# Patient Record
Sex: FEMALE | Race: WHITE | NOT HISPANIC OR LATINO | Employment: FULL TIME | URBAN - NONMETROPOLITAN AREA
[De-identification: names, ages, dates, MRNs, and addresses within clinical notes are randomized per-mention and may not be internally consistent; named-entity substitution may affect disease eponyms.]

---
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
## 2020-02-22 ENCOUNTER — HOSPITAL ENCOUNTER (EMERGENCY)
Facility: HOSPITAL | Age: 20
Discharge: HOME | End: 2020-02-22
Attending: EMERGENCY MEDICINE | Admitting: EMERGENCY MEDICINE

## 2020-02-22 ENCOUNTER — APPOINTMENT (OUTPATIENT)
Dept: CT IMAGING | Facility: HOSPITAL | Age: 20
End: 2020-02-22

## 2020-02-22 VITALS
TEMPERATURE: 97.6 F | HEIGHT: 62 IN | OXYGEN SATURATION: 98 % | SYSTOLIC BLOOD PRESSURE: 113 MMHG | HEART RATE: 78 BPM | DIASTOLIC BLOOD PRESSURE: 65 MMHG | BODY MASS INDEX: 20.06 KG/M2 | RESPIRATION RATE: 19 BRPM | WEIGHT: 109 LBS

## 2020-02-22 DIAGNOSIS — R51.9 ACUTE NONINTRACTABLE HEADACHE, UNSPECIFIED HEADACHE TYPE: ICD-10-CM
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
## 2020-02-22 LAB — B-HCG UR QL: NEGATIVE

## 2020-02-22 PROCEDURE — 99284 EMERGENCY DEPT VISIT MOD MDM: CPT

## 2020-02-22 PROCEDURE — 81025 URINE PREGNANCY TEST: CPT | Performed by: PHYSICIAN ASSISTANT

## 2020-02-22 PROCEDURE — 70450 CT HEAD/BRAIN W/O DYE: CPT

## 2020-02-22 PROCEDURE — 72125 CT NECK SPINE W/O DYE: CPT

## 2020-02-22 PROCEDURE — 72125 CT NECK SPINE W/O DYE: CPT | Performed by: RADIOLOGY

## 2020-02-22 PROCEDURE — 70450 CT HEAD/BRAIN W/O DYE: CPT | Performed by: RADIOLOGY

## 2020-02-22 RX ORDER — SODIUM CHLORIDE 0.9 % (FLUSH) 0.9 %
10 SYRINGE (ML) INJECTION AS NEEDED
Status: DISCONTINUED | OUTPATIENT
Start: 2020-02-22 | End: 2020-02-22

## 2020-02-22 RX ORDER — ACETAMINOPHEN 500 MG
500 TABLET ORAL ONCE
Status: COMPLETED | OUTPATIENT
Start: 2020-02-22 | End: 2020-02-22

## 2020-02-22 RX ADMIN — ACETAMINOPHEN 500 MG: 500 TABLET ORAL at 12:59

## 2020-02-22 NOTE — ED NOTES
Patient was encouraged to produce a urine to rule out pregnancy prior to  Imaging.     Ej Hendrickson RN  02/22/20 3963
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
## 2020-04-10 ENCOUNTER — LAB (OUTPATIENT)
Dept: LAB | Facility: HOSPITAL | Age: 20
End: 2020-04-10

## 2020-04-10 DIAGNOSIS — Z20.828 EXPOSURE TO SARS-ASSOCIATED CORONAVIRUS: Primary | ICD-10-CM

## 2020-04-10 DIAGNOSIS — Z20.828 EXPOSURE TO SARS-ASSOCIATED CORONAVIRUS: ICD-10-CM

## 2020-04-10 PROCEDURE — 87635 SARS-COV-2 COVID-19 AMP PRB: CPT

## 2020-04-10 PROCEDURE — U0003 INFECTIOUS AGENT DETECTION BY NUCLEIC ACID (DNA OR RNA); SEVERE ACUTE RESPIRATORY SYNDROME CORONAVIRUS 2 (SARS-COV-2) (CORONAVIRUS DISEASE [COVID-19]), AMPLIFIED PROBE TECHNIQUE, MAKING USE OF HIGH THROUGHPUT TECHNOLOGIES AS DESCRIBED BY CMS-2020-01-R: HCPCS

## 2020-04-11 LAB — SARS-COV-2 RNA RESP QL NAA+PROBE: NOT DETECTED

## 2021-03-15 ENCOUNTER — HOSPITAL ENCOUNTER (EMERGENCY)
Facility: HOSPITAL | Age: 21
Discharge: HOME | End: 2021-03-15
Attending: FAMILY MEDICINE | Admitting: FAMILY MEDICINE

## 2021-03-15 VITALS
OXYGEN SATURATION: 99 % | SYSTOLIC BLOOD PRESSURE: 126 MMHG | TEMPERATURE: 99.1 F | BODY MASS INDEX: 22.08 KG/M2 | RESPIRATION RATE: 16 BRPM | WEIGHT: 120 LBS | HEART RATE: 84 BPM | HEIGHT: 62 IN | DIASTOLIC BLOOD PRESSURE: 65 MMHG

## 2021-03-15 DIAGNOSIS — H60.502 ACUTE OTITIS EXTERNA OF LEFT EAR, UNSPECIFIED TYPE: ICD-10-CM

## 2021-03-15 DIAGNOSIS — H66.90 ACUTE OTITIS MEDIA, UNSPECIFIED OTITIS MEDIA TYPE: Primary | ICD-10-CM

## 2021-03-15 PROCEDURE — 99282 EMERGENCY DEPT VISIT SF MDM: CPT

## 2021-03-15 RX ORDER — OFLOXACIN 3 MG/ML
5 SOLUTION AURICULAR (OTIC) DAILY
Qty: 5 ML | Refills: 0 | Status: SHIPPED | OUTPATIENT
Start: 2021-03-15 | End: 2021-03-22

## 2021-03-15 RX ORDER — AMOXICILLIN AND CLAVULANATE POTASSIUM 875; 125 MG/1; MG/1
1 TABLET, FILM COATED ORAL 2 TIMES DAILY
Qty: 20 TABLET | Refills: 0 | Status: ON HOLD | OUTPATIENT
Start: 2021-03-15 | End: 2021-12-13

## 2021-03-15 NOTE — ED PROVIDER NOTES
Subjective   20-year-old female presents the emergency room complaints of left earache.  Patient states she had an area for the past 3 days.  She denies fever chills nausea vomiting she has neck pain headache sinus pressure nasal congestion patient has sore throat she has difficulty swallowing.  She denies cough congestion.  She states that tonight she started having drainage from her left ear.  She does report that she has been using a Q-tip to her left ear as well.  She states that her hearing remains intact but does feel like occasionally her ear pops.  She has not been on antibiotics she is not taking medication for this issue.      Earache  Location:  Left  Quality:  Aching  Duration:  3 days  Timing:  Constant  Chronicity:  New  Relieved by:  Nothing  Associated symptoms: no congestion, no cough, no diarrhea, no fever, no headaches, no rhinorrhea and no sore throat        Review of Systems   Constitutional: Negative for fever.   HENT: Positive for ear pain. Negative for congestion, rhinorrhea and sore throat.    Respiratory: Negative for cough.    Gastrointestinal: Negative for diarrhea.   Neurological: Negative for headaches.   All other systems reviewed and are negative.      No past medical history on file.    No Known Allergies    Past Surgical History:   Procedure Laterality Date   • WISDOM TOOTH EXTRACTION  10/16/2017       Family History   Problem Relation Age of Onset   • Hypertension Father    • Thyroid disease Father    • Hypertension Mother    • Diabetes Mother    • Thyroid disease Mother    • Throat cancer Mother    • Stomach cancer Mother    • Ovarian cancer Mother    • Rheum arthritis Mother    • Seizures Mother    • Asthma Mother    • Rheum arthritis Paternal Grandfather    • Skin cancer Maternal Grandfather    • Atrial fibrillation Maternal Grandfather    • Rheum arthritis Maternal Grandfather    • Stroke Maternal Grandfather    • Asthma Brother        Social History     Socioeconomic History    • Marital status:      Spouse name: Not on file   • Number of children: Not on file   • Years of education: Not on file   • Highest education level: Not on file   Tobacco Use   • Smoking status: Never Smoker   • Smokeless tobacco: Never Used   Substance and Sexual Activity   • Alcohol use: No   • Drug use: No   • Sexual activity: Yes     Partners: Male     Birth control/protection: None           Objective   Physical Exam  Vitals and nursing note reviewed.   HENT:      Head: Normocephalic and atraumatic.      Right Ear: Tympanic membrane and ear canal normal.      Ears:      Comments: Left tympanic membrane red with fluid behind left ear.  Retracted left tympanic membrane.  Auditory canal diffuse red with with fluid-filled cyst anterior aspect.     Nose: Nose normal.      Mouth/Throat:      Mouth: Mucous membranes are moist.   Eyes:      Extraocular Movements: Extraocular movements intact.      Pupils: Pupils are equal, round, and reactive to light.   Neck:      Comments: No nuchal rigidity no cervical adenopathy.  Cardiovascular:      Rate and Rhythm: Normal rate and regular rhythm.   Pulmonary:      Effort: Pulmonary effort is normal.      Breath sounds: Normal breath sounds.      Comments: No rhonchi's rales or wheezes.  No accessory muscle use.  Abdominal:      General: Bowel sounds are normal.      Palpations: Abdomen is soft.   Musculoskeletal:      Cervical back: Neck supple.   Skin:     General: Skin is warm and dry.      Capillary Refill: Capillary refill takes less than 2 seconds.   Neurological:      Mental Status: She is alert and oriented to person, place, and time.      Cranial Nerves: No cranial nerve deficit.      Sensory: No sensory deficit.   Psychiatric:         Mood and Affect: Mood normal.         Procedures           ED Course  ED Course as of Mar 15 0436   Mon Mar 15, 2021   0435 Patient is noted to have findings of cyst in her auditory canal.  No foreign body was appreciated.   Patient has a tympanic membrane that is inflamed appearing patient has no mastoid tenderness.  Have discussed discontinuing Q-tips.  Have discussed follow-up with ENT and to contact office today.  Patient replaced on oral and topical antibiotics.  Have discussed warning symptoms went to the emergency room patient verbalized understanding.  Patient is afebrile nontoxic-appearing prior to discharge.    [BB]      ED Course User Index  [BB] Laurent Adan MD                                           [facility]    Final diagnoses:   Acute otitis media, unspecified otitis media type   Acute otitis externa of left ear, unspecified type            Laurent Adan MD  03/15/21 0437

## 2021-03-18 ENCOUNTER — BULK ORDERING (OUTPATIENT)
Dept: CASE MANAGEMENT | Facility: OTHER | Age: 21
End: 2021-03-18

## 2021-03-18 DIAGNOSIS — Z23 IMMUNIZATION DUE: ICD-10-CM

## 2021-12-04 ENCOUNTER — HOSPITAL ENCOUNTER (EMERGENCY)
Facility: HOSPITAL | Age: 21
Discharge: HOME | End: 2021-12-05
Attending: EMERGENCY MEDICINE | Admitting: EMERGENCY MEDICINE

## 2021-12-04 DIAGNOSIS — N83.202 CYST OF LEFT OVARY: ICD-10-CM

## 2021-12-04 DIAGNOSIS — O20.0 THREATENED ABORTION: Primary | ICD-10-CM

## 2021-12-04 LAB
ALBUMIN SERPL-MCNC: 4.33 G/DL (ref 3.5–5.2)
ALBUMIN/GLOB SERPL: 1.6 G/DL
ALP SERPL-CCNC: 76 U/L (ref 39–117)
ALT SERPL W P-5'-P-CCNC: 7 U/L (ref 1–33)
ANION GAP SERPL CALCULATED.3IONS-SCNC: 13.2 MMOL/L (ref 5–15)
AST SERPL-CCNC: 12 U/L (ref 1–32)
BASOPHILS # BLD AUTO: 0.06 10*3/MM3 (ref 0–0.2)
BASOPHILS NFR BLD AUTO: 0.5 % (ref 0–1.5)
BILIRUB SERPL-MCNC: 0.2 MG/DL (ref 0–1.2)
BUN SERPL-MCNC: 8 MG/DL (ref 6–20)
BUN/CREAT SERPL: 12.5 (ref 7–25)
CALCIUM SPEC-SCNC: 9.3 MG/DL (ref 8.6–10.5)
CHLORIDE SERPL-SCNC: 103 MMOL/L (ref 98–107)
CO2 SERPL-SCNC: 24.8 MMOL/L (ref 22–29)
CREAT SERPL-MCNC: 0.64 MG/DL (ref 0.57–1)
DEPRECATED RDW RBC AUTO: 41.3 FL (ref 37–54)
EOSINOPHIL # BLD AUTO: 0.01 10*3/MM3 (ref 0–0.4)
EOSINOPHIL NFR BLD AUTO: 0.1 % (ref 0.3–6.2)
ERYTHROCYTE [DISTWIDTH] IN BLOOD BY AUTOMATED COUNT: 12.1 % (ref 12.3–15.4)
GFR SERPL CREATININE-BSD FRML MDRD: 117 ML/MIN/1.73
GLOBULIN UR ELPH-MCNC: 2.8 GM/DL
GLUCOSE SERPL-MCNC: 96 MG/DL (ref 65–99)
HCT VFR BLD AUTO: 40.4 % (ref 34–46.6)
HGB BLD-MCNC: 13.5 G/DL (ref 12–15.9)
IMM GRANULOCYTES # BLD AUTO: 0.05 10*3/MM3 (ref 0–0.05)
IMM GRANULOCYTES NFR BLD AUTO: 0.4 % (ref 0–0.5)
LYMPHOCYTES # BLD AUTO: 1.72 10*3/MM3 (ref 0.7–3.1)
LYMPHOCYTES NFR BLD AUTO: 14.2 % (ref 19.6–45.3)
MCH RBC QN AUTO: 31.1 PG (ref 26.6–33)
MCHC RBC AUTO-ENTMCNC: 33.4 G/DL (ref 31.5–35.7)
MCV RBC AUTO: 93.1 FL (ref 79–97)
MONOCYTES # BLD AUTO: 0.7 10*3/MM3 (ref 0.1–0.9)
MONOCYTES NFR BLD AUTO: 5.8 % (ref 5–12)
NEUTROPHILS NFR BLD AUTO: 79 % (ref 42.7–76)
NEUTROPHILS NFR BLD AUTO: 9.57 10*3/MM3 (ref 1.7–7)
NRBC BLD AUTO-RTO: 0 /100 WBC (ref 0–0.2)
NUMBER OF DOSES: NORMAL
PLATELET # BLD AUTO: 225 10*3/MM3 (ref 140–450)
PMV BLD AUTO: 9.5 FL (ref 6–12)
POTASSIUM SERPL-SCNC: 4.1 MMOL/L (ref 3.5–5.2)
PROT SERPL-MCNC: 7.1 G/DL (ref 6–8.5)
RBC # BLD AUTO: 4.34 10*6/MM3 (ref 3.77–5.28)
SODIUM SERPL-SCNC: 141 MMOL/L (ref 136–145)
WBC NRBC COR # BLD: 12.11 10*3/MM3 (ref 3.4–10.8)

## 2021-12-04 PROCEDURE — 85025 COMPLETE CBC W/AUTO DIFF WBC: CPT | Performed by: PHYSICIAN ASSISTANT

## 2021-12-04 PROCEDURE — 86850 RBC ANTIBODY SCREEN: CPT | Performed by: PHYSICIAN ASSISTANT

## 2021-12-04 PROCEDURE — 86901 BLOOD TYPING SEROLOGIC RH(D): CPT | Performed by: PHYSICIAN ASSISTANT

## 2021-12-04 PROCEDURE — 80053 COMPREHEN METABOLIC PANEL: CPT | Performed by: PHYSICIAN ASSISTANT

## 2021-12-04 PROCEDURE — 86900 BLOOD TYPING SEROLOGIC ABO: CPT | Performed by: PHYSICIAN ASSISTANT

## 2021-12-04 PROCEDURE — 84702 CHORIONIC GONADOTROPIN TEST: CPT | Performed by: PHYSICIAN ASSISTANT

## 2021-12-04 PROCEDURE — 99283 EMERGENCY DEPT VISIT LOW MDM: CPT

## 2021-12-04 PROCEDURE — 36415 COLL VENOUS BLD VENIPUNCTURE: CPT

## 2021-12-05 ENCOUNTER — APPOINTMENT (OUTPATIENT)
Dept: ULTRASOUND IMAGING | Facility: HOSPITAL | Age: 21
End: 2021-12-05

## 2021-12-05 VITALS
TEMPERATURE: 98.1 F | SYSTOLIC BLOOD PRESSURE: 102 MMHG | DIASTOLIC BLOOD PRESSURE: 68 MMHG | BODY MASS INDEX: 21.71 KG/M2 | OXYGEN SATURATION: 98 % | RESPIRATION RATE: 18 BRPM | HEART RATE: 98 BPM | HEIGHT: 62 IN | WEIGHT: 118 LBS

## 2021-12-05 LAB
ABO GROUP BLD: NORMAL
BACTERIA UR QL AUTO: ABNORMAL /HPF
BILIRUB UR QL STRIP: NEGATIVE
BLD GP AB SCN SERPL QL: NEGATIVE
CLARITY UR: ABNORMAL
COLOR UR: ABNORMAL
GLUCOSE UR STRIP-MCNC: NEGATIVE MG/DL
HCG INTACT+B SERPL-ACNC: 99.66 MIU/ML
HGB UR QL STRIP.AUTO: ABNORMAL
HYALINE CASTS UR QL AUTO: ABNORMAL /LPF
KETONES UR QL STRIP: NEGATIVE
LEUKOCYTE ESTERASE UR QL STRIP.AUTO: NEGATIVE
NITRITE UR QL STRIP: NEGATIVE
PH UR STRIP.AUTO: 6.5 [PH] (ref 5–8)
PROT UR QL STRIP: ABNORMAL
RBC # UR STRIP: ABNORMAL /HPF
REF LAB TEST METHOD: ABNORMAL
RH BLD: POSITIVE
SP GR UR STRIP: 1.02 (ref 1–1.03)
SQUAMOUS #/AREA URNS HPF: ABNORMAL /HPF
UROBILINOGEN UR QL STRIP: ABNORMAL
WBC # UR STRIP: ABNORMAL /HPF

## 2021-12-05 PROCEDURE — 76817 TRANSVAGINAL US OBSTETRIC: CPT

## 2021-12-05 PROCEDURE — 81001 URINALYSIS AUTO W/SCOPE: CPT | Performed by: PHYSICIAN ASSISTANT

## 2021-12-05 RX ORDER — ACETAMINOPHEN 500 MG
1000 TABLET ORAL ONCE
Status: COMPLETED | OUTPATIENT
Start: 2021-12-05 | End: 2021-12-05

## 2021-12-05 RX ADMIN — ACETAMINOPHEN 1000 MG: 500 TABLET ORAL at 00:43

## 2021-12-05 NOTE — ED NOTES
Provided patient with urine cup. Informed patient the need for urine sample. Patient verbalized understanding. Patient unable to provide sample at this christopher. Will continue to encourage.     Suzette Rausch  12/04/21 2001

## 2021-12-05 NOTE — ED NOTES
Patient still unable to provide urine specimen. Will continue to encourage.      Kaden Dasilva, RN  12/04/21 8109

## 2021-12-05 NOTE — ED PROVIDER NOTES
Subjective   Patient is a 21-year-old female that presents to the ED with complaints of vaginal bleeding and abdominal cramping.  She is approximately 4 weeks and 6 days pregnant.  She states that she has been having some vaginal spotting for the last several days.  She states she is also having some left lower quadrant abdominal pain.  She does have a known ovarian cyst that is recently been found by her OB/GYN.  She also states that she has a history of miscarriages.  She denies chest pain, shortness of breath, fever, chills, nausea, vomiting, headache, dizziness.,  Dysuria, diarrhea, or constipation.      History provided by:  Patient   used: No        Review of Systems   Constitutional: Negative.  Negative for chills, diaphoresis, fatigue and fever.   HENT: Negative for congestion, drooling, ear discharge, ear pain, facial swelling, nosebleeds, postnasal drip, rhinorrhea, sinus pressure, sinus pain, sneezing, sore throat, tinnitus and trouble swallowing.    Eyes: Negative.  Negative for photophobia, pain, discharge, redness and itching.   Respiratory: Negative.  Negative for cough, shortness of breath and wheezing.    Cardiovascular: Negative.  Negative for chest pain.   Gastrointestinal: Negative.  Negative for abdominal distention, abdominal pain, diarrhea, nausea and vomiting.   Endocrine: Negative.    Genitourinary: Positive for vaginal bleeding. Negative for difficulty urinating, dysuria, flank pain, frequency and vaginal discharge.   Musculoskeletal: Negative.  Negative for arthralgias, back pain, gait problem, joint swelling, myalgias, neck pain and neck stiffness.   Skin: Negative.    Neurological: Negative.  Negative for dizziness, tremors, seizures, syncope, facial asymmetry, speech difficulty, weakness, light-headedness, numbness and headaches.   Psychiatric/Behavioral: Negative.  Negative for confusion.   All other systems reviewed and are negative.      History reviewed. No  pertinent past medical history.    No Known Allergies    Past Surgical History:   Procedure Laterality Date   • WISDOM TOOTH EXTRACTION  10/16/2017       Family History   Problem Relation Age of Onset   • Hypertension Father    • Thyroid disease Father    • Hypertension Mother    • Diabetes Mother    • Thyroid disease Mother    • Throat cancer Mother    • Stomach cancer Mother    • Ovarian cancer Mother    • Rheum arthritis Mother    • Seizures Mother    • Asthma Mother    • Rheum arthritis Paternal Grandfather    • Skin cancer Maternal Grandfather    • Atrial fibrillation Maternal Grandfather    • Rheum arthritis Maternal Grandfather    • Stroke Maternal Grandfather    • Asthma Brother        Social History     Socioeconomic History   • Marital status:    Tobacco Use   • Smoking status: Never Smoker   • Smokeless tobacco: Never Used   Substance and Sexual Activity   • Alcohol use: No   • Drug use: No   • Sexual activity: Yes     Partners: Male     Birth control/protection: None           Objective   Physical Exam  Vitals and nursing note reviewed.   Constitutional:       General: She is not in acute distress.     Appearance: She is well-developed. She is not diaphoretic.   HENT:      Head: Normocephalic and atraumatic.      Right Ear: External ear normal.      Left Ear: External ear normal.      Nose: Nose normal.      Mouth/Throat:      Mouth: Mucous membranes are moist.      Pharynx: Oropharynx is clear.   Eyes:      Extraocular Movements: Extraocular movements intact.      Conjunctiva/sclera: Conjunctivae normal.      Pupils: Pupils are equal, round, and reactive to light.   Neck:      Vascular: No JVD.      Trachea: No tracheal deviation.   Cardiovascular:      Rate and Rhythm: Normal rate and regular rhythm.      Pulses: Normal pulses.      Heart sounds: Normal heart sounds. No murmur heard.      Pulmonary:      Effort: Pulmonary effort is normal. No respiratory distress.      Breath sounds: Normal  breath sounds. No wheezing.   Abdominal:      General: Bowel sounds are normal. There is no distension.      Palpations: Abdomen is soft.      Tenderness: There is no abdominal tenderness. There is no guarding or rebound.      Hernia: No hernia is present.   Musculoskeletal:         General: No deformity. Normal range of motion.      Cervical back: Normal range of motion and neck supple.   Skin:     General: Skin is warm and dry.      Coloration: Skin is not pale.      Findings: No erythema or rash.   Neurological:      General: No focal deficit present.      Mental Status: She is alert and oriented to person, place, and time.      Cranial Nerves: No cranial nerve deficit.   Psychiatric:         Mood and Affect: Mood normal.         Behavior: Behavior normal.         Thought Content: Thought content normal.         Procedures           ED Course  ED Course as of 21 0142   Sun Dec 05, 2021   0115 US Ob Transvaginal  IMPRESSION:  Uterus and endometrial tissue appear normal. No gestational sac is visualized. There is no evidence of extrauterine pregnancy. This may be due to the patient's early pregnancy state and clinical correlation with possible follow-up ultrasound  is recommended     6 cm simple appearing left ovarian cyst     Otherwise normal     Signer Name: Leonard Gutierrez MD   Signed: 2021 1:11 AM []   0141 Discussed plan of care with patient.  Advised close follow-up with OB/GYN in 1 to 2 days.  Advised if she should develop fever or any worsening symptoms to return to the ED immediately. []      ED Course User Index  [] Milagros Gonzalez PA-C MDM    Final diagnoses:   Threatened    Cyst of left ovary       ED Disposition  ED Disposition     ED Disposition Condition Comment    Discharge Stable           Tj Alejo, DO  [address]  [address] [place] [address] [id number] [phone]    Schedule an appointment as soon as  possible for a visit in 1 day           Medication List      No changes were made to your prescriptions during this visit.          Milagros Gonzalez PA-C  12/05/21 0142

## 2021-12-10 ENCOUNTER — APPOINTMENT (OUTPATIENT)
Dept: ULTRASOUND IMAGING | Facility: HOSPITAL | Age: 21
End: 2021-12-10

## 2021-12-10 ENCOUNTER — HOSPITAL ENCOUNTER (EMERGENCY)
Facility: HOSPITAL | Age: 21
Discharge: HOME | End: 2021-12-10
Attending: EMERGENCY MEDICINE | Admitting: EMERGENCY MEDICINE

## 2021-12-10 VITALS
OXYGEN SATURATION: 97 % | RESPIRATION RATE: 18 BRPM | WEIGHT: 122 LBS | TEMPERATURE: 99.2 F | SYSTOLIC BLOOD PRESSURE: 93 MMHG | DIASTOLIC BLOOD PRESSURE: 58 MMHG | BODY MASS INDEX: 22.45 KG/M2 | HEART RATE: 71 BPM | HEIGHT: 62 IN

## 2021-12-10 DIAGNOSIS — O00.90 ECTOPIC PREGNANCY WITHOUT INTRAUTERINE PREGNANCY, UNSPECIFIED LOCATION: Primary | ICD-10-CM

## 2021-12-10 LAB
ABO GROUP BLD: NORMAL
ALBUMIN SERPL-MCNC: 4.15 G/DL (ref 3.5–5.2)
ALBUMIN/GLOB SERPL: 1.5 G/DL
ALP SERPL-CCNC: 74 U/L (ref 39–117)
ALT SERPL W P-5'-P-CCNC: 7 U/L (ref 1–33)
ANION GAP SERPL CALCULATED.3IONS-SCNC: 11.1 MMOL/L (ref 5–15)
AST SERPL-CCNC: 13 U/L (ref 1–32)
BACTERIA UR QL AUTO: NORMAL /HPF
BASOPHILS # BLD AUTO: 0.06 10*3/MM3 (ref 0–0.2)
BASOPHILS NFR BLD AUTO: 0.7 % (ref 0–1.5)
BILIRUB SERPL-MCNC: 0.5 MG/DL (ref 0–1.2)
BILIRUB UR QL STRIP: NEGATIVE
BLD GP AB SCN SERPL QL: NEGATIVE
BUN SERPL-MCNC: 6 MG/DL (ref 6–20)
BUN/CREAT SERPL: 10.7 (ref 7–25)
CALCIUM SPEC-SCNC: 9 MG/DL (ref 8.6–10.5)
CHLORIDE SERPL-SCNC: 104 MMOL/L (ref 98–107)
CLARITY UR: CLEAR
CO2 SERPL-SCNC: 22.9 MMOL/L (ref 22–29)
COLOR UR: YELLOW
CREAT SERPL-MCNC: 0.56 MG/DL (ref 0.57–1)
DEPRECATED RDW RBC AUTO: 43.3 FL (ref 37–54)
EOSINOPHIL # BLD AUTO: 0.02 10*3/MM3 (ref 0–0.4)
EOSINOPHIL NFR BLD AUTO: 0.2 % (ref 0.3–6.2)
ERYTHROCYTE [DISTWIDTH] IN BLOOD BY AUTOMATED COUNT: 12.6 % (ref 12.3–15.4)
GFR SERPL CREATININE-BSD FRML MDRD: 137 ML/MIN/1.73
GLOBULIN UR ELPH-MCNC: 2.8 GM/DL
GLUCOSE SERPL-MCNC: 92 MG/DL (ref 65–99)
GLUCOSE UR STRIP-MCNC: NEGATIVE MG/DL
HCG INTACT+B SERPL-ACNC: 116.9 MIU/ML
HCT VFR BLD AUTO: 40.2 % (ref 34–46.6)
HGB BLD-MCNC: 13 G/DL (ref 12–15.9)
HGB UR QL STRIP.AUTO: ABNORMAL
HYALINE CASTS UR QL AUTO: NORMAL /LPF
IMM GRANULOCYTES # BLD AUTO: 0.03 10*3/MM3 (ref 0–0.05)
IMM GRANULOCYTES NFR BLD AUTO: 0.4 % (ref 0–0.5)
KETONES UR QL STRIP: NEGATIVE
LEUKOCYTE ESTERASE UR QL STRIP.AUTO: NEGATIVE
LYMPHOCYTES # BLD AUTO: 1.63 10*3/MM3 (ref 0.7–3.1)
LYMPHOCYTES NFR BLD AUTO: 19.2 % (ref 19.6–45.3)
MCH RBC QN AUTO: 30.4 PG (ref 26.6–33)
MCHC RBC AUTO-ENTMCNC: 32.3 G/DL (ref 31.5–35.7)
MCV RBC AUTO: 94.1 FL (ref 79–97)
MONOCYTES # BLD AUTO: 0.54 10*3/MM3 (ref 0.1–0.9)
MONOCYTES NFR BLD AUTO: 6.3 % (ref 5–12)
NEUTROPHILS NFR BLD AUTO: 6.23 10*3/MM3 (ref 1.7–7)
NEUTROPHILS NFR BLD AUTO: 73.2 % (ref 42.7–76)
NITRITE UR QL STRIP: NEGATIVE
NRBC BLD AUTO-RTO: 0 /100 WBC (ref 0–0.2)
NUMBER OF DOSES: NORMAL
PH UR STRIP.AUTO: 8.5 [PH] (ref 5–8)
PLATELET # BLD AUTO: 241 10*3/MM3 (ref 140–450)
PMV BLD AUTO: 9.8 FL (ref 6–12)
POTASSIUM SERPL-SCNC: 4.1 MMOL/L (ref 3.5–5.2)
PROT SERPL-MCNC: 6.9 G/DL (ref 6–8.5)
PROT UR QL STRIP: NEGATIVE
RBC # BLD AUTO: 4.27 10*6/MM3 (ref 3.77–5.28)
RBC # UR STRIP: NORMAL /HPF
REF LAB TEST METHOD: NORMAL
RH BLD: POSITIVE
SODIUM SERPL-SCNC: 138 MMOL/L (ref 136–145)
SP GR UR STRIP: 1.01 (ref 1–1.03)
SQUAMOUS #/AREA URNS HPF: NORMAL /HPF
UROBILINOGEN UR QL STRIP: ABNORMAL
WBC # UR STRIP: NORMAL /HPF
WBC NRBC COR # BLD: 8.51 10*3/MM3 (ref 3.4–10.8)

## 2021-12-10 PROCEDURE — 25010000002 METHOTREXATE SODIUM SOLUTION: Performed by: EMERGENCY MEDICINE

## 2021-12-10 PROCEDURE — 85025 COMPLETE CBC W/AUTO DIFF WBC: CPT | Performed by: PHYSICIAN ASSISTANT

## 2021-12-10 PROCEDURE — 36415 COLL VENOUS BLD VENIPUNCTURE: CPT

## 2021-12-10 PROCEDURE — 81001 URINALYSIS AUTO W/SCOPE: CPT | Performed by: PHYSICIAN ASSISTANT

## 2021-12-10 PROCEDURE — 84702 CHORIONIC GONADOTROPIN TEST: CPT | Performed by: PHYSICIAN ASSISTANT

## 2021-12-10 PROCEDURE — 99283 EMERGENCY DEPT VISIT LOW MDM: CPT

## 2021-12-10 PROCEDURE — 96372 THER/PROPH/DIAG INJ SC/IM: CPT

## 2021-12-10 PROCEDURE — 86900 BLOOD TYPING SEROLOGIC ABO: CPT | Performed by: PHYSICIAN ASSISTANT

## 2021-12-10 PROCEDURE — 80053 COMPREHEN METABOLIC PANEL: CPT | Performed by: PHYSICIAN ASSISTANT

## 2021-12-10 PROCEDURE — 76817 TRANSVAGINAL US OBSTETRIC: CPT

## 2021-12-10 PROCEDURE — 86850 RBC ANTIBODY SCREEN: CPT | Performed by: PHYSICIAN ASSISTANT

## 2021-12-10 PROCEDURE — 86901 BLOOD TYPING SEROLOGIC RH(D): CPT | Performed by: PHYSICIAN ASSISTANT

## 2021-12-10 RX ORDER — METHOTREXATE SODIUM 25 MG/ML
75 INJECTION, SOLUTION INTRA-ARTERIAL; INTRAMUSCULAR; INTRAVENOUS ONCE
Status: DISCONTINUED | OUTPATIENT
Start: 2021-12-10 | End: 2021-12-10

## 2021-12-10 RX ORDER — METHOTREXATE SODIUM 25 MG/ML
75 INJECTION, SOLUTION INTRA-ARTERIAL; INTRAMUSCULAR; INTRAVENOUS ONCE
Status: COMPLETED | OUTPATIENT
Start: 2021-12-10 | End: 2021-12-10

## 2021-12-10 RX ADMIN — METHOTREXATE 75 MG: 25 SOLUTION INTRA-ARTERIAL; INTRAMUSCULAR; INTRATHECAL; INTRAVENOUS at 21:18

## 2021-12-10 NOTE — ED NOTES
MEDICAL SCREENING:    Reason for Visit: Sent by Physician to evaluate for ectopic pregnancy    Patient initially seen in triage.  The patient was advised further evaluation and diagnostic testing will be needed, some of the treatment and testing will be initiated in the lobby in order to begin the process.  The patient will be returned to the waiting area for the time being and possibly be re-assessed by a subsequent ED provider.  The patient will be brought back to the treatment area in as timely manner as possible.       Milagros Gonzalez PA-C  12/10/21 0277

## 2021-12-11 NOTE — ED PROVIDER NOTES
Subjective   21-year-old female  presenting with concern for ectopic pregnancy.  Her last menstrual period was 10/31/2021.  She had some spotting in early pregnancy and now for the last week has been bleeding.  She reports bright red blood without clots.  She has had some intermittent lower abdominal pains but none currently.  She was seen previously on  and had a large left-sided ovarian cyst and no confirmed IUP.  She followed up again today with her regular provider at [facility] and was referred into the ER for further evaluation since her hCG continued to increase.      History provided by:  Patient   used: No        Review of Systems   Constitutional: Negative.  Negative for fever.   HENT: Negative.    Eyes: Negative.    Respiratory: Negative.  Negative for shortness of breath.    Cardiovascular: Negative.  Negative for chest pain.   Gastrointestinal: Negative.  Negative for abdominal pain.   Genitourinary: Positive for vaginal bleeding. Negative for dysuria and vaginal discharge.   Skin: Negative.    Neurological: Negative.    Psychiatric/Behavioral: Negative.    All other systems reviewed and are negative.      No past medical history on file.    No Known Allergies    Past Surgical History:   Procedure Laterality Date   • WISDOM TOOTH EXTRACTION  10/16/2017       Family History   Problem Relation Age of Onset   • Hypertension Father    • Thyroid disease Father    • Hypertension Mother    • Diabetes Mother    • Thyroid disease Mother    • Throat cancer Mother    • Stomach cancer Mother    • Ovarian cancer Mother    • Rheum arthritis Mother    • Seizures Mother    • Asthma Mother    • Rheum arthritis Paternal Grandfather    • Skin cancer Maternal Grandfather    • Atrial fibrillation Maternal Grandfather    • Rheum arthritis Maternal Grandfather    • Stroke Maternal Grandfather    • Asthma Brother        Social History     Socioeconomic History   • Marital status:     Tobacco Use   • Smoking status: Never Smoker   • Smokeless tobacco: Never Used   Substance and Sexual Activity   • Alcohol use: No   • Drug use: No   • Sexual activity: Yes     Partners: Male     Birth control/protection: None           Objective   Physical Exam  Vitals and nursing note reviewed.   Constitutional:       General: She is not in acute distress.     Appearance: She is well-developed. She is not diaphoretic.   HENT:      Head: Normocephalic and atraumatic.      Right Ear: External ear normal.      Left Ear: External ear normal.      Nose: Nose normal.   Eyes:      Conjunctiva/sclera: Conjunctivae normal.      Pupils: Pupils are equal, round, and reactive to light.   Neck:      Vascular: No JVD.      Trachea: No tracheal deviation.   Cardiovascular:      Rate and Rhythm: Normal rate and regular rhythm.      Heart sounds: Normal heart sounds. No murmur heard.      Pulmonary:      Effort: Pulmonary effort is normal. No respiratory distress.      Breath sounds: Normal breath sounds. No wheezing.   Abdominal:      General: Bowel sounds are normal.      Palpations: Abdomen is soft.      Tenderness: There is no abdominal tenderness.   Musculoskeletal:         General: No deformity. Normal range of motion.      Cervical back: Normal range of motion and neck supple.   Skin:     General: Skin is warm and dry.      Coloration: Skin is not pale.      Findings: No erythema or rash.   Neurological:      Mental Status: She is alert and oriented to person, place, and time.      Cranial Nerves: No cranial nerve deficit.   Psychiatric:         Behavior: Behavior normal.         Thought Content: Thought content normal.         Procedures           ED Course  ED Course as of 12/10/21 2055   Fri Dec 10, 2021   2031 Discussed with Dr. Agustin, recommends methotrexate 50 mg/m².  Patient can follow-up in the office on Tuesday. [DH]      ED Course User Index  [DH] Huber Dunn MD                                                  MDM    Final diagnoses:   Ectopic pregnancy without intrauterine pregnancy, unspecified location       ED Disposition  ED Disposition     ED Disposition Condition Comment    Discharge Stable           Ben Agustin MD  [address] [facility]  [address]  [facility] [address]  [phone]    Schedule an appointment as soon as possible for a visit on 12/14/2021           Medication List      No changes were made to your prescriptions during this visit.          Huber Dunn MD  12/10/21 2055

## 2021-12-13 ENCOUNTER — HOSPITAL ENCOUNTER (OUTPATIENT)
Facility: HOSPITAL | Age: 21
Setting detail: OBSERVATION
Discharge: HOME | End: 2021-12-15
Attending: OBSTETRICS & GYNECOLOGY | Admitting: OBSTETRICS & GYNECOLOGY

## 2021-12-13 LAB
ABO GROUP BLD: NORMAL
BASOPHILS # BLD AUTO: 0.04 10*3/MM3 (ref 0–0.2)
BASOPHILS # BLD AUTO: 0.05 10*3/MM3 (ref 0–0.2)
BASOPHILS NFR BLD AUTO: 0.4 % (ref 0–1.5)
BASOPHILS NFR BLD AUTO: 0.5 % (ref 0–1.5)
BLD GP AB SCN SERPL QL: NEGATIVE
DEPRECATED RDW RBC AUTO: 43.1 FL (ref 37–54)
DEPRECATED RDW RBC AUTO: 44 FL (ref 37–54)
EOSINOPHIL # BLD AUTO: 0.01 10*3/MM3 (ref 0–0.4)
EOSINOPHIL # BLD AUTO: 0.01 10*3/MM3 (ref 0–0.4)
EOSINOPHIL NFR BLD AUTO: 0.1 % (ref 0.3–6.2)
EOSINOPHIL NFR BLD AUTO: 0.1 % (ref 0.3–6.2)
ERYTHROCYTE [DISTWIDTH] IN BLOOD BY AUTOMATED COUNT: 12.3 % (ref 12.3–15.4)
ERYTHROCYTE [DISTWIDTH] IN BLOOD BY AUTOMATED COUNT: 12.4 % (ref 12.3–15.4)
HCG INTACT+B SERPL-ACNC: 78.4 MIU/ML
HCT VFR BLD AUTO: 36 % (ref 34–46.6)
HCT VFR BLD AUTO: 38.3 % (ref 34–46.6)
HGB BLD-MCNC: 11.8 G/DL (ref 12–15.9)
HGB BLD-MCNC: 12.2 G/DL (ref 12–15.9)
IMM GRANULOCYTES # BLD AUTO: 0.02 10*3/MM3 (ref 0–0.05)
IMM GRANULOCYTES # BLD AUTO: 0.04 10*3/MM3 (ref 0–0.05)
IMM GRANULOCYTES NFR BLD AUTO: 0.2 % (ref 0–0.5)
IMM GRANULOCYTES NFR BLD AUTO: 0.4 % (ref 0–0.5)
LYMPHOCYTES # BLD AUTO: 1.43 10*3/MM3 (ref 0.7–3.1)
LYMPHOCYTES # BLD AUTO: 2.05 10*3/MM3 (ref 0.7–3.1)
LYMPHOCYTES NFR BLD AUTO: 14.4 % (ref 19.6–45.3)
LYMPHOCYTES NFR BLD AUTO: 19.8 % (ref 19.6–45.3)
MCH RBC QN AUTO: 31 PG (ref 26.6–33)
MCH RBC QN AUTO: 31.1 PG (ref 26.6–33)
MCHC RBC AUTO-ENTMCNC: 31.9 G/DL (ref 31.5–35.7)
MCHC RBC AUTO-ENTMCNC: 32.8 G/DL (ref 31.5–35.7)
MCV RBC AUTO: 94.7 FL (ref 79–97)
MCV RBC AUTO: 97.2 FL (ref 79–97)
MONOCYTES # BLD AUTO: 0.45 10*3/MM3 (ref 0.1–0.9)
MONOCYTES # BLD AUTO: 0.62 10*3/MM3 (ref 0.1–0.9)
MONOCYTES NFR BLD AUTO: 4.5 % (ref 5–12)
MONOCYTES NFR BLD AUTO: 6 % (ref 5–12)
NEUTROPHILS NFR BLD AUTO: 7.6 10*3/MM3 (ref 1.7–7)
NEUTROPHILS NFR BLD AUTO: 73.3 % (ref 42.7–76)
NEUTROPHILS NFR BLD AUTO: 8 10*3/MM3 (ref 1.7–7)
NEUTROPHILS NFR BLD AUTO: 80.3 % (ref 42.7–76)
NRBC BLD AUTO-RTO: 0 /100 WBC (ref 0–0.2)
NRBC BLD AUTO-RTO: 0 /100 WBC (ref 0–0.2)
PLATELET # BLD AUTO: 220 10*3/MM3 (ref 140–450)
PLATELET # BLD AUTO: 225 10*3/MM3 (ref 140–450)
PMV BLD AUTO: 9.6 FL (ref 6–12)
PMV BLD AUTO: 9.8 FL (ref 6–12)
RBC # BLD AUTO: 3.8 10*6/MM3 (ref 3.77–5.28)
RBC # BLD AUTO: 3.94 10*6/MM3 (ref 3.77–5.28)
RH BLD: POSITIVE
T&S EXPIRATION DATE: NORMAL
WBC NRBC COR # BLD: 10.36 10*3/MM3 (ref 3.4–10.8)
WBC NRBC COR # BLD: 9.96 10*3/MM3 (ref 3.4–10.8)

## 2021-12-13 PROCEDURE — 86900 BLOOD TYPING SEROLOGIC ABO: CPT | Performed by: OBSTETRICS & GYNECOLOGY

## 2021-12-13 PROCEDURE — 86901 BLOOD TYPING SEROLOGIC RH(D): CPT | Performed by: OBSTETRICS & GYNECOLOGY

## 2021-12-13 PROCEDURE — 85025 COMPLETE CBC W/AUTO DIFF WBC: CPT | Performed by: OBSTETRICS & GYNECOLOGY

## 2021-12-13 PROCEDURE — 86850 RBC ANTIBODY SCREEN: CPT | Performed by: OBSTETRICS & GYNECOLOGY

## 2021-12-13 PROCEDURE — G0379 DIRECT REFER HOSPITAL OBSERV: HCPCS

## 2021-12-13 PROCEDURE — G0378 HOSPITAL OBSERVATION PER HR: HCPCS

## 2021-12-13 PROCEDURE — 84702 CHORIONIC GONADOTROPIN TEST: CPT | Performed by: OBSTETRICS & GYNECOLOGY

## 2021-12-13 PROCEDURE — 96360 HYDRATION IV INFUSION INIT: CPT

## 2021-12-13 PROCEDURE — 96361 HYDRATE IV INFUSION ADD-ON: CPT

## 2021-12-13 RX ORDER — SODIUM CHLORIDE, SODIUM LACTATE, POTASSIUM CHLORIDE, CALCIUM CHLORIDE 600; 310; 30; 20 MG/100ML; MG/100ML; MG/100ML; MG/100ML
100 INJECTION, SOLUTION INTRAVENOUS CONTINUOUS
Status: DISCONTINUED | OUTPATIENT
Start: 2021-12-13 | End: 2021-12-15 | Stop reason: HOSPADM

## 2021-12-13 RX ORDER — ACETAMINOPHEN 325 MG/1
650 TABLET ORAL EVERY 6 HOURS PRN
Status: DISCONTINUED | OUTPATIENT
Start: 2021-12-13 | End: 2021-12-15 | Stop reason: HOSPADM

## 2021-12-13 RX ADMIN — SODIUM CHLORIDE, POTASSIUM CHLORIDE, SODIUM LACTATE AND CALCIUM CHLORIDE 100 ML/HR: 600; 310; 30; 20 INJECTION, SOLUTION INTRAVENOUS at 23:40

## 2021-12-13 RX ADMIN — SODIUM CHLORIDE, POTASSIUM CHLORIDE, SODIUM LACTATE AND CALCIUM CHLORIDE 100 ML/HR: 600; 310; 30; 20 INJECTION, SOLUTION INTRAVENOUS at 16:40

## 2021-12-13 NOTE — H&P
[facility]  Gynecology History and Physical        Subjective     Patient is a 21 y.o. female  admitted for observation for ectopic pregnancy from clinic.   Patient was given MTX for medical management of ectopic pregnancy 12/10/2021. Pt states that since last night, she has been having intense abdominal pain that seems to come and go, rated 6-7/10, worse in LLQ. She had nausea 2 nights ago, but not ongoing. She has been having light vaginal bleeding.          Prenatal Information:   Maternal Prenatal Labs  Blood Type ABO Type   Date Value Ref Range Status   2021 O  Final      Rh Status RH type   Date Value Ref Range Status   2021 Positive  Final      Antibody Screen Antibody Screen   Date Value Ref Range Status   2021 Negative  Final      Rapid Urin Drug Screen No results found for: AMPMETHU, BARBITSCNUR, LABBENZSCN, LABMETHSCN, LABOPIASCN, THCURSCR, COCAINEUR, AMPHETSCREEN, PROPOXSCN, BUPRENORSCNU, METAMPSCNUR, OXYCODONESCN, TRICYCLICSCN               External Prenatal Results    The patient does not have an associated pregnancy episode and working [employer]. Some results will not display without a pregnancy episode and working [employer].   Pregnancy Outside Results - Transcribed From Office Records - See Scanned Records For Details     Test Value Date Time    ABO  O  21 1549    Rh  Positive  21 1549    Antibody Screen       Varicella IgG       Rubella       Hgb       Hct       Glucose Fasting GTT       Glucose Tolerance Test 1 hour       Glucose Tolerance Test 3 hour       Gonorrhea (discrete)       Chlamydia (discrete)       RPR       VDRL       Syphilis Antibody       HBsAg       Herpes Simplex Virus PCR       Herpes Simplex VIrus Culture       HIV       Hep C RNA Quant PCR       Hep C Antibody       AFP       Group B Strep       GBS Susceptibility to Clindamycin       GBS Susceptibility to Erythromycin       Fetal Fibronectin       Genetic Testing, Maternal Blood             Drug  Screening     Test Value Date Time    Urine Drug Screen       Amphetamine Screen       Barbiturate Screen       Benzodiazepine Screen       Methadone Screen       Phencyclidine Screen       Opiates Screen       THC Screen       Cocaine Screen       Propoxyphene Screen       Buprenorphine Screen       Methamphetamine Screen       Oxycodone Screen       Tricyclic Antidepressants Screen             Legend    ^: Historical                          Past OB History:     OB History    Para Term  AB Living   3 1 1 0 1 1   SAB IAB Ectopic Molar Multiple Live Births   1 0 0 0 0 1      # Outcome Date GA Lbr Wisam/2nd Weight Sex Delivery Anes PTL Lv   3 Current            2 Term 19 39w0d  3640 g (8 lb 0.4 oz) M Vag-Spont EPI N LIVIA      Birth Comments: 97.8, 170, 60      Name: ADALGISA KENNY      Apgar1: 8  Apgar5: 9   1 SAB 18 7w0d    SAB          Past Medical History: No past medical history on file.   Past Surgical History Past Surgical History:   Procedure Laterality Date   • WISDOM TOOTH EXTRACTION  10/16/2017      Family History: Family History   Problem Relation Age of Onset   • Hypertension Father    • Thyroid disease Father    • Hypertension Mother    • Diabetes Mother    • Thyroid disease Mother    • Throat cancer Mother    • Stomach cancer Mother    • Ovarian cancer Mother    • Rheum arthritis Mother    • Seizures Mother    • Asthma Mother    • Rheum arthritis Paternal Grandfather    • Skin cancer Maternal Grandfather    • Atrial fibrillation Maternal Grandfather    • Rheum arthritis Maternal Grandfather    • Stroke Maternal Grandfather    • Asthma Brother       Social History:  reports that she has never smoked. She has never used smokeless tobacco.   reports no history of alcohol use.   reports no history of drug use.        Review of Systems      Objective     Vital Signs Range for the last 24 hours  Temperature: Temp:  [98.4 °F (36.9 °C)] 98.4 °F (36.9 °C)   Temp Source: Temp src:  Axillary   BP: BP: (123)/(74) 123/74   Pulse: Heart Rate:  [85] 85   Respirations: Resp:  [16] 16   Weight:       Physical Examination:  Gen: NAD  CV: Regular rate  Resp: normal work of breathing on room air  Abd: moderate tenderness to palpation in LLQ. No rebound/guarding.       Laboratory Results  Results for HIMANSHU KENNY (MRN [id number]) as of 12/13/2021 17:08   Ref. Range 12/4/2021 23:19 12/10/2021 18:35 12/13/2021 15:49   HCG Quantitative Latest Units: mIU/mL 99.66 116.90 78.40   Results for HIMANSHU KENNY (MRN [id number]) as of 12/13/2021 17:08   Ref. Range 12/4/2021 23:19 12/10/2021 18:35 12/13/2021 15:49   WBC Latest Ref Range: 3.40 - 10.80 10*3/mm3 12.11 (H) 8.51 9.96   RBC Latest Ref Range: 3.77 - 5.28 10*6/mm3 4.34 4.27 3.94   Hemoglobin Latest Ref Range: 12.0 - 15.9 g/dL 13.5 13.0 12.2   Hematocrit Latest Ref Range: 34.0 - 46.6 % 40.4 40.2 38.3   RDW Latest Ref Range: 12.3 - 15.4 % 12.1 (L) 12.6 12.3   MCV Latest Ref Range: 79.0 - 97.0 fL 93.1 94.1 97.2 (H)   MCH Latest Ref Range: 26.6 - 33.0 pg 31.1 30.4 31.0   MCHC Latest Ref Range: 31.5 - 35.7 g/dL 33.4 32.3 31.9   MPV Latest Ref Range: 6.0 - 12.0 fL 9.5 9.8 9.8   Platelets Latest Ref Range: 140 - 450 10*3/mm3 225 241 220   RDW-SD Latest Ref Range: 37.0 - 54.0 fl 41.3 43.3 44.0     Assessment/Plan       * No active hospital problems. *      Assessment & Plan    Assessment:  Ectopic pregnancy  Abdominal pain    Plan:  1. [facility] with serial abdominal exams  2. NPO with MIVF  3. Repeat CBC at 2200 this evening  4. Tylenol PRN for pain      Shu Looney MD  12/13/2021  17:08 EST

## 2021-12-13 NOTE — PLAN OF CARE
Goal Outcome Evaluation:  Plan of Care Reviewed With: patient, spouse        Progress: improving  Outcome Summary: SM AMT VAGINAL SPOTTING NOW/ STATES PAIN AT NONE NOW/ REMAINS NPO

## 2021-12-14 PROBLEM — O00.90 ECTOPIC PREGNANCY: Status: ACTIVE | Noted: 2021-12-14

## 2021-12-14 LAB
BASOPHILS # BLD AUTO: 0.07 10*3/MM3 (ref 0–0.2)
BASOPHILS NFR BLD AUTO: 0.9 % (ref 0–1.5)
DEPRECATED RDW RBC AUTO: 42.6 FL (ref 37–54)
EOSINOPHIL # BLD AUTO: 0.05 10*3/MM3 (ref 0–0.4)
EOSINOPHIL NFR BLD AUTO: 0.6 % (ref 0.3–6.2)
ERYTHROCYTE [DISTWIDTH] IN BLOOD BY AUTOMATED COUNT: 12.3 % (ref 12.3–15.4)
HCG INTACT+B SERPL-ACNC: 83.22 MIU/ML
HCT VFR BLD AUTO: 35.5 % (ref 34–46.6)
HGB BLD-MCNC: 11.6 G/DL (ref 12–15.9)
IMM GRANULOCYTES # BLD AUTO: 0.01 10*3/MM3 (ref 0–0.05)
IMM GRANULOCYTES NFR BLD AUTO: 0.1 % (ref 0–0.5)
LYMPHOCYTES # BLD AUTO: 2.11 10*3/MM3 (ref 0.7–3.1)
LYMPHOCYTES NFR BLD AUTO: 27.3 % (ref 19.6–45.3)
MCH RBC QN AUTO: 31.2 PG (ref 26.6–33)
MCHC RBC AUTO-ENTMCNC: 32.7 G/DL (ref 31.5–35.7)
MCV RBC AUTO: 95.4 FL (ref 79–97)
MONOCYTES # BLD AUTO: 0.43 10*3/MM3 (ref 0.1–0.9)
MONOCYTES NFR BLD AUTO: 5.6 % (ref 5–12)
NEUTROPHILS NFR BLD AUTO: 5.05 10*3/MM3 (ref 1.7–7)
NEUTROPHILS NFR BLD AUTO: 65.5 % (ref 42.7–76)
NRBC BLD AUTO-RTO: 0 /100 WBC (ref 0–0.2)
PLATELET # BLD AUTO: 203 10*3/MM3 (ref 140–450)
PMV BLD AUTO: 9.8 FL (ref 6–12)
RBC # BLD AUTO: 3.72 10*6/MM3 (ref 3.77–5.28)
WBC NRBC COR # BLD: 7.72 10*3/MM3 (ref 3.4–10.8)

## 2021-12-14 PROCEDURE — 96361 HYDRATE IV INFUSION ADD-ON: CPT

## 2021-12-14 PROCEDURE — 85025 COMPLETE CBC W/AUTO DIFF WBC: CPT | Performed by: NURSE PRACTITIONER

## 2021-12-14 PROCEDURE — G0378 HOSPITAL OBSERVATION PER HR: HCPCS

## 2021-12-14 PROCEDURE — 84702 CHORIONIC GONADOTROPIN TEST: CPT | Performed by: NURSE PRACTITIONER

## 2021-12-14 RX ADMIN — SODIUM CHLORIDE, POTASSIUM CHLORIDE, SODIUM LACTATE AND CALCIUM CHLORIDE 100 ML/HR: 600; 310; 30; 20 INJECTION, SOLUTION INTRAVENOUS at 20:15

## 2021-12-14 RX ADMIN — ACETAMINOPHEN 650 MG: 325 TABLET ORAL at 12:25

## 2021-12-14 RX ADMIN — SODIUM CHLORIDE, POTASSIUM CHLORIDE, SODIUM LACTATE AND CALCIUM CHLORIDE 100 ML/HR: 600; 310; 30; 20 INJECTION, SOLUTION INTRAVENOUS at 09:33

## 2021-12-14 NOTE — PLAN OF CARE
Goal Outcome Evaluation:  Plan of Care Reviewed With: patient, spouse        Progress: improving  Outcome Summary: states pain not as bad/ tolerating reg diet/   vitals stable

## 2021-12-14 NOTE — PLAN OF CARE
Problem: Adult Inpatient Plan of Care  Goal: Plan of Care Review  Outcome: Ongoing, Progressing  Flowsheets (Taken 12/14/2021 0551)  Progress: no change  Plan of Care Reviewed With: patient  Outcome Summary: Patient doing okay. NPO. States she is spotting and having mild cramps, denies need for medication for cramps.  Goal: Patient-Specific Goal (Individualized)  Outcome: Ongoing, Progressing  Goal: Absence of Hospital-Acquired Illness or Injury  Outcome: Ongoing, Progressing  Intervention: Identify and Manage Fall Risk  Recent Flowsheet Documentation  Taken 12/13/2021 1930 by Doreen Rodriguez, RN  Safety Promotion/Fall Prevention: safety round/check completed  Goal: Optimal Comfort and Wellbeing  Outcome: Ongoing, Progressing  Intervention: Provide Person-Centered Care  Recent Flowsheet Documentation  Taken 12/13/2021 1930 by Doreen Rodriguez RN  Trust Relationship/Rapport:   care explained   choices provided  Goal: Readiness for Transition of Care  Outcome: Ongoing, Progressing   Goal Outcome Evaluation:  Plan of Care Reviewed With: patient        Progress: no change  Outcome Summary: Patient doing okay. NPO. States she is spotting and having mild cramps, denies need for medication for cramps.

## 2021-12-15 VITALS
OXYGEN SATURATION: 100 % | DIASTOLIC BLOOD PRESSURE: 54 MMHG | SYSTOLIC BLOOD PRESSURE: 94 MMHG | TEMPERATURE: 97.9 F | RESPIRATION RATE: 18 BRPM | HEART RATE: 76 BPM

## 2021-12-15 PROCEDURE — G0378 HOSPITAL OBSERVATION PER HR: HCPCS

## 2021-12-15 PROCEDURE — 96361 HYDRATE IV INFUSION ADD-ON: CPT

## 2021-12-15 RX ADMIN — ACETAMINOPHEN 650 MG: 325 TABLET ORAL at 05:26

## 2021-12-15 RX ADMIN — SODIUM CHLORIDE, POTASSIUM CHLORIDE, SODIUM LACTATE AND CALCIUM CHLORIDE 100 ML/HR: 600; 310; 30; 20 INJECTION, SOLUTION INTRAVENOUS at 06:31

## 2021-12-15 NOTE — PLAN OF CARE
Problem: Adult Inpatient Plan of Care  Goal: Plan of Care Review  Outcome: Ongoing, Progressing  Flowsheets (Taken 12/15/2021 0608)  Progress: improving  Outcome Summary: Patient doing well. Vs stable only request pain meds x1 this shift. Patient took shower this shift  Goal: Patient-Specific Goal (Individualized)  Outcome: Ongoing, Progressing  Goal: Absence of Hospital-Acquired Illness or Injury  Outcome: Ongoing, Progressing  Intervention: Identify and Manage Fall Risk  Recent Flowsheet Documentation  Taken 12/14/2021 2000 by Doreen Rodriguez, RN  Safety Promotion/Fall Prevention: safety round/check completed  Intervention: Prevent Skin Injury  Recent Flowsheet Documentation  Taken 12/14/2021 2000 by Doreen Rodriguez, RN  Body Position: position changed independently  Intervention: Prevent Infection  Recent Flowsheet Documentation  Taken 12/14/2021 2000 by Doreen Rodriguez, RN  Infection Prevention: visitors restricted/screened  Goal: Optimal Comfort and Wellbeing  Outcome: Ongoing, Progressing  Goal: Readiness for Transition of Care  Outcome: Ongoing, Progressing   Goal Outcome Evaluation:  Plan of Care Reviewed With: patient        Progress: improving  Outcome Summary: Patient doing well. Vs stable only request pain meds x1 this shift. Patient took shower this shift

## 2021-12-15 NOTE — PLAN OF CARE
Goal Outcome Evaluation:               Plan of care discussed with pt. Goals met discharge orders received

## 2021-12-17 ENCOUNTER — TRANSCRIBE ORDERS (OUTPATIENT)
Dept: LAB | Facility: HOSPITAL | Age: 21
End: 2021-12-17

## 2021-12-17 ENCOUNTER — LAB (OUTPATIENT)
Dept: LAB | Facility: HOSPITAL | Age: 21
End: 2021-12-17

## 2021-12-17 DIAGNOSIS — O36.80X0 PREGNANCY WITH INCONCLUSIVE FETAL VIABILITY, SINGLE OR UNSPECIFIED FETUS: Primary | ICD-10-CM

## 2021-12-17 DIAGNOSIS — O36.80X0 PREGNANCY WITH INCONCLUSIVE FETAL VIABILITY, SINGLE OR UNSPECIFIED FETUS: ICD-10-CM

## 2021-12-17 LAB
BASOPHILS # BLD AUTO: 0.06 10*3/MM3 (ref 0–0.2)
BASOPHILS NFR BLD AUTO: 0.9 % (ref 0–1.5)
DEPRECATED RDW RBC AUTO: 43.6 FL (ref 37–54)
EOSINOPHIL # BLD AUTO: 0.05 10*3/MM3 (ref 0–0.4)
EOSINOPHIL NFR BLD AUTO: 0.7 % (ref 0.3–6.2)
ERYTHROCYTE [DISTWIDTH] IN BLOOD BY AUTOMATED COUNT: 12.7 % (ref 12.3–15.4)
HCG INTACT+B SERPL-ACNC: 51.59 MIU/ML
HCT VFR BLD AUTO: 40.5 % (ref 34–46.6)
HGB BLD-MCNC: 13.3 G/DL (ref 12–15.9)
IMM GRANULOCYTES # BLD AUTO: 0.01 10*3/MM3 (ref 0–0.05)
IMM GRANULOCYTES NFR BLD AUTO: 0.1 % (ref 0–0.5)
LYMPHOCYTES # BLD AUTO: 1.39 10*3/MM3 (ref 0.7–3.1)
LYMPHOCYTES NFR BLD AUTO: 20 % (ref 19.6–45.3)
MCH RBC QN AUTO: 30.9 PG (ref 26.6–33)
MCHC RBC AUTO-ENTMCNC: 32.8 G/DL (ref 31.5–35.7)
MCV RBC AUTO: 94.2 FL (ref 79–97)
MONOCYTES # BLD AUTO: 0.57 10*3/MM3 (ref 0.1–0.9)
MONOCYTES NFR BLD AUTO: 8.2 % (ref 5–12)
NEUTROPHILS NFR BLD AUTO: 4.86 10*3/MM3 (ref 1.7–7)
NEUTROPHILS NFR BLD AUTO: 70.1 % (ref 42.7–76)
NRBC BLD AUTO-RTO: 0 /100 WBC (ref 0–0.2)
PLATELET # BLD AUTO: 264 10*3/MM3 (ref 140–450)
PMV BLD AUTO: 9.6 FL (ref 6–12)
RBC # BLD AUTO: 4.3 10*6/MM3 (ref 3.77–5.28)
WBC NRBC COR # BLD: 6.94 10*3/MM3 (ref 3.4–10.8)

## 2021-12-17 PROCEDURE — 85025 COMPLETE CBC W/AUTO DIFF WBC: CPT

## 2021-12-17 PROCEDURE — 36415 COLL VENOUS BLD VENIPUNCTURE: CPT

## 2021-12-17 PROCEDURE — 84702 CHORIONIC GONADOTROPIN TEST: CPT

## 2022-05-20 NOTE — DISCHARGE SUMMARY
Date of Discharge: 12/15/21     Discharge Diagnosis:   Ectopic pregnancy    Problem List:    Ectopic pregnancy      Presenting Problem/History of Present Illness  Ectopic pregnancy [O00.90]      Hospital Course  Patient is a 21 y.o. female presented with the above diagnosis. Pt received  Pain has improved. Continuing to follow hcg levels.          Consults:   Consults     No orders found from 11/14/2021 to 12/14/2021.          Pertinent Test Results:    Condition on Discharge: Stable  Vital Signs  Temp:  [97.9 °F (36.6 °C)-98.5 °F (36.9 °C)] 97.9 °F (36.6 °C)  Heart Rate:  [76-90] 76  Resp:  [16-18] 18  BP: (92-98)/(52-55) 94/54    Discharge Disposition  home  Discharge Medications     Discharge Medications      Patient Not Prescribed Medications Upon Discharge         Discharge Diet   regular    Activity at Discharge  ad krissy    Follow-up Appointments  Pt has appt Friday at [facility].     Time: Discharge 15 min   This note was copied from a baby's chart. Mother and baby for discharge. Mother states baby has been latching on and breastfeeding well. Baby was latched on and breastfeeding well during [facility] visit. Reviewed breastfeeding basics:  Supply and demand, breastfeed baby 8-12 times in 24 hr., feed baby on demand,  stomach size, early  Feeding cues, skin to skin, positioning and baby led latch-on, assymetrical latch with signs of good, deep latch vs shallow, feeding frequency and duration, and log sheet for tracking infant feedings and output. Breastfeeding Booklet and Warm line information given. Discussed typical  weight loss and the importance of infant weight checks with pediatrician 1-2 post discharge. Care for sore/tender nipples discussed:  ways to improve positioning and latch practiced and discussed, hand express colostrum after feedings and let air dry, light application of lanolin, hydrogel pads, seek comfortable laid back feeding position, start feedings on least sore side first.    Engorgement Care Guidelines:  Reviewed how milk is made and normal phases of milk production. Taught care of engorged breasts - frequent breastfeeding encouraged, cool packs and motrin as tolerated. Anticipatory guidance shared. Discussed eating a healthy diet. Instructed mother to eat a variety of foods in order to get a well balanced diet. She should consume an extra 500 calories per day (more than her non-pregnant requirement.) These extra calories will help provide energy needed for optimal breast milk production. Mother also encouraged to \"drink to thirst\" and it is recommended that she drink fluids such as water, fruit/vegetable juice. Nutritious snacks should be available so that she can eat throughout the day to help satisfy her hunger and maintain a good milk supply. Discussed pumping/storage and preparation of expressed breast milk.      Mother will successfully establish breastfeeding by feeding in response to early feeding cues   or wake every 3h, will obtain deep latch, and will keep log of feedings/output. Taught to BF at hunger cues and or q 2-3 hrs and to offer 10-20 drops of hand expressed colostrum at any non-feeds. Breast Assessment  Left Breast: Medium  Left Nipple: Everted,Intact  Right Breast: Medium  Right Nipple: Everted,Intact  Breast- Feeding Assessment  Attends Breast-Feeding Classes: No  Breast-Feeding Experience: No  Breast Trauma/Surgery: No  Type/Quality: Good  Lactation Consultant Visits  Breast-Feedings: Good   Mother/Infant Observation  Mother Observation: Alignment,Holds breast,Breast comfortable,Close hold,Recognizes feeding cues  Infant Observation: Audible swallows,Lips flanged, upper,Opens mouth,Feeding cues,Rhythmic suck,Latches nipple and aereolae,Lips flanged, lower,Breast tissue moves  LATCH Documentation  Latch: Grasps breast, tongue down, lips flanged, rhythmic sucking  Audible Swallowing: A few with stimulation  Type of Nipple: Everted (after stimulation)  Comfort (Breast/Nipple): Soft/non-tender  Hold (Positioning): Full assist, teach one side, mother does other, staff holds  [facility] Score: 8    Chart shows numerous feedings, void, stool WNL. Discussed importance of monitoring outputs and feedings on first week of life. Discussed ways to tell if baby is  getting enough breast milk, ie  voids and stools, change in color of stool, and return to birth wt within 2 weeks. Follow up with pediatrician visit for weight check in 1-2 days (per AAP guidelines.)  Encouraged to call Warm Line  [phone]  for any questions/problems that arise.  Mother also given breastfeeding support group dates and times for any future needs

## 2023-09-04 ENCOUNTER — HOSPITAL ENCOUNTER (EMERGENCY)
Facility: HOSPITAL | Age: 23
Discharge: HOME | End: 2023-09-04
Attending: STUDENT IN AN ORGANIZED HEALTH CARE EDUCATION/TRAINING PROGRAM | Admitting: STUDENT IN AN ORGANIZED HEALTH CARE EDUCATION/TRAINING PROGRAM
Payer: MEDICAID

## 2023-09-04 ENCOUNTER — APPOINTMENT (OUTPATIENT)
Dept: ULTRASOUND IMAGING | Facility: HOSPITAL | Age: 23
End: 2023-09-04
Payer: MEDICAID

## 2023-09-04 VITALS
BODY MASS INDEX: 20.24 KG/M2 | HEART RATE: 86 BPM | HEIGHT: 62 IN | RESPIRATION RATE: 17 BRPM | WEIGHT: 110 LBS | SYSTOLIC BLOOD PRESSURE: 127 MMHG | TEMPERATURE: 97.9 F | DIASTOLIC BLOOD PRESSURE: 70 MMHG | OXYGEN SATURATION: 100 %

## 2023-09-04 DIAGNOSIS — O46.90 VAGINAL BLEEDING DURING PREGNANCY: Primary | ICD-10-CM

## 2023-09-04 LAB
ABO GROUP BLD: NORMAL
ALBUMIN SERPL-MCNC: 4.6 G/DL (ref 3.5–5.2)
ALBUMIN/GLOB SERPL: 1.7 G/DL
ALP SERPL-CCNC: 72 U/L (ref 39–117)
ALT SERPL W P-5'-P-CCNC: 6 U/L (ref 1–33)
ANION GAP SERPL CALCULATED.3IONS-SCNC: 10.2 MMOL/L (ref 5–15)
AST SERPL-CCNC: 12 U/L (ref 1–32)
BASOPHILS # BLD AUTO: 0.06 10*3/MM3 (ref 0–0.2)
BASOPHILS NFR BLD AUTO: 0.7 % (ref 0–1.5)
BILIRUB SERPL-MCNC: 0.5 MG/DL (ref 0–1.2)
BILIRUB UR QL STRIP: NEGATIVE
BLD GP AB SCN SERPL QL: NEGATIVE
BUN SERPL-MCNC: 12 MG/DL (ref 6–20)
BUN/CREAT SERPL: 17.6 (ref 7–25)
CALCIUM SPEC-SCNC: 9.3 MG/DL (ref 8.6–10.5)
CHLORIDE SERPL-SCNC: 104 MMOL/L (ref 98–107)
CLARITY UR: CLEAR
CO2 SERPL-SCNC: 24.8 MMOL/L (ref 22–29)
COLOR UR: YELLOW
CREAT SERPL-MCNC: 0.68 MG/DL (ref 0.57–1)
CRP SERPL-MCNC: <0.3 MG/DL (ref 0–0.5)
DEPRECATED RDW RBC AUTO: 42.5 FL (ref 37–54)
EGFRCR SERPLBLD CKD-EPI 2021: 126.5 ML/MIN/1.73
EOSINOPHIL # BLD AUTO: 0.04 10*3/MM3 (ref 0–0.4)
EOSINOPHIL NFR BLD AUTO: 0.5 % (ref 0.3–6.2)
ERYTHROCYTE [DISTWIDTH] IN BLOOD BY AUTOMATED COUNT: 12.3 % (ref 12.3–15.4)
ERYTHROCYTE [SEDIMENTATION RATE] IN BLOOD: 2 MM/HR (ref 0–20)
GLOBULIN UR ELPH-MCNC: 2.7 GM/DL
GLUCOSE SERPL-MCNC: 121 MG/DL (ref 65–99)
GLUCOSE UR STRIP-MCNC: NEGATIVE MG/DL
HCG INTACT+B SERPL-ACNC: 3265 MIU/ML
HCT VFR BLD AUTO: 42.3 % (ref 34–46.6)
HGB BLD-MCNC: 14.1 G/DL (ref 12–15.9)
HGB UR QL STRIP.AUTO: NEGATIVE
HOLD SPECIMEN: NORMAL
HOLD SPECIMEN: NORMAL
IMM GRANULOCYTES # BLD AUTO: 0.02 10*3/MM3 (ref 0–0.05)
IMM GRANULOCYTES NFR BLD AUTO: 0.2 % (ref 0–0.5)
KETONES UR QL STRIP: NEGATIVE
LEUKOCYTE ESTERASE UR QL STRIP.AUTO: NEGATIVE
LYMPHOCYTES # BLD AUTO: 2.14 10*3/MM3 (ref 0.7–3.1)
LYMPHOCYTES NFR BLD AUTO: 26.3 % (ref 19.6–45.3)
MCH RBC QN AUTO: 31.1 PG (ref 26.6–33)
MCHC RBC AUTO-ENTMCNC: 33.3 G/DL (ref 31.5–35.7)
MCV RBC AUTO: 93.2 FL (ref 79–97)
MONOCYTES # BLD AUTO: 0.71 10*3/MM3 (ref 0.1–0.9)
MONOCYTES NFR BLD AUTO: 8.7 % (ref 5–12)
NEUTROPHILS NFR BLD AUTO: 5.16 10*3/MM3 (ref 1.7–7)
NEUTROPHILS NFR BLD AUTO: 63.6 % (ref 42.7–76)
NITRITE UR QL STRIP: NEGATIVE
NRBC BLD AUTO-RTO: 0 /100 WBC (ref 0–0.2)
NUMBER OF DOSES: NORMAL
PH UR STRIP.AUTO: 6.5 [PH] (ref 5–8)
PLATELET # BLD AUTO: 233 10*3/MM3 (ref 140–450)
PMV BLD AUTO: 9.5 FL (ref 6–12)
POTASSIUM SERPL-SCNC: 3.8 MMOL/L (ref 3.5–5.2)
PROT SERPL-MCNC: 7.3 G/DL (ref 6–8.5)
PROT UR QL STRIP: NEGATIVE
RBC # BLD AUTO: 4.54 10*6/MM3 (ref 3.77–5.28)
RH BLD: POSITIVE
SODIUM SERPL-SCNC: 139 MMOL/L (ref 136–145)
SP GR UR STRIP: 1.01 (ref 1–1.03)
UROBILINOGEN UR QL STRIP: NORMAL
WBC NRBC COR # BLD: 8.13 10*3/MM3 (ref 3.4–10.8)
WHOLE BLOOD HOLD COAG: NORMAL
WHOLE BLOOD HOLD SPECIMEN: NORMAL

## 2023-09-04 PROCEDURE — 85652 RBC SED RATE AUTOMATED: CPT | Performed by: NURSE PRACTITIONER

## 2023-09-04 PROCEDURE — 85025 COMPLETE CBC W/AUTO DIFF WBC: CPT | Performed by: NURSE PRACTITIONER

## 2023-09-04 PROCEDURE — 86140 C-REACTIVE PROTEIN: CPT | Performed by: NURSE PRACTITIONER

## 2023-09-04 PROCEDURE — 81003 URINALYSIS AUTO W/O SCOPE: CPT | Performed by: NURSE PRACTITIONER

## 2023-09-04 PROCEDURE — 76801 OB US < 14 WKS SINGLE FETUS: CPT | Performed by: RADIOLOGY

## 2023-09-04 PROCEDURE — 99284 EMERGENCY DEPT VISIT MOD MDM: CPT

## 2023-09-04 PROCEDURE — 86900 BLOOD TYPING SEROLOGIC ABO: CPT | Performed by: NURSE PRACTITIONER

## 2023-09-04 PROCEDURE — 76801 OB US < 14 WKS SINGLE FETUS: CPT

## 2023-09-04 PROCEDURE — 86850 RBC ANTIBODY SCREEN: CPT | Performed by: NURSE PRACTITIONER

## 2023-09-04 PROCEDURE — 84702 CHORIONIC GONADOTROPIN TEST: CPT | Performed by: NURSE PRACTITIONER

## 2023-09-04 PROCEDURE — 36415 COLL VENOUS BLD VENIPUNCTURE: CPT

## 2023-09-04 PROCEDURE — 86901 BLOOD TYPING SEROLOGIC RH(D): CPT | Performed by: NURSE PRACTITIONER

## 2023-09-04 PROCEDURE — 80053 COMPREHEN METABOLIC PANEL: CPT | Performed by: NURSE PRACTITIONER

## 2023-09-04 RX ORDER — SODIUM CHLORIDE 0.9 % (FLUSH) 0.9 %
10 SYRINGE (ML) INJECTION AS NEEDED
Status: DISCONTINUED | OUTPATIENT
Start: 2023-09-04 | End: 2023-09-04 | Stop reason: HOSPADM

## 2023-09-04 RX ADMIN — SODIUM CHLORIDE, POTASSIUM CHLORIDE, SODIUM LACTATE AND CALCIUM CHLORIDE 1000 ML: 600; 310; 30; 20 INJECTION, SOLUTION INTRAVENOUS at 20:29

## 2023-09-06 NOTE — ED PROVIDER NOTES
Subjective   History of Present Illness  Patient is a 22-year-old female with no significant past medical history presenting to the ER complaints of vaginal bleeding.  Patient is pregnant.  Patient does have a history of ectopic pregnancy and is concerned. Patient states that she is 6 weeks gestation and she began spotting and having lumbar back pain yesterday.  Patient denies dysuria, urinary frequency, fever, nausea patient denies any additional symptoms today.    History provided by:  Patient   used: No      Review of Systems   Constitutional: Negative.  Negative for fever.   HENT: Negative.     Respiratory: Negative.     Cardiovascular: Negative.  Negative for chest pain.   Gastrointestinal: Negative.  Negative for abdominal pain.   Endocrine: Negative.    Genitourinary:  Positive for vaginal bleeding. Negative for dysuria.   Musculoskeletal:  Positive for back pain.   Skin: Negative.    Neurological: Negative.    Psychiatric/Behavioral: Negative.     All other systems reviewed and are negative.    No past medical history on file.    No Known Allergies    Past Surgical History:   Procedure Laterality Date    WISDOM TOOTH EXTRACTION  10/16/2017       Family History   Problem Relation Age of Onset    Hypertension Father     Thyroid disease Father     Hypertension Mother     Diabetes Mother     Thyroid disease Mother     Throat cancer Mother     Stomach cancer Mother     Ovarian cancer Mother     Rheum arthritis Mother     Seizures Mother     Asthma Mother     Rheum arthritis Paternal Grandfather     Skin cancer Maternal Grandfather     Atrial fibrillation Maternal Grandfather     Rheum arthritis Maternal Grandfather     Stroke Maternal Grandfather     Asthma Brother        Social History     Socioeconomic History    Marital status:    Tobacco Use    Smoking status: Never    Smokeless tobacco: Never   Substance and Sexual Activity    Alcohol use: No    Drug use: No    Sexual activity: Yes      Partners: Male     Birth control/protection: None           Objective   Physical Exam  Vitals and nursing note reviewed.   Constitutional:       General: She is not in acute distress.     Appearance: She is well-developed. She is not diaphoretic.   HENT:      Head: Normocephalic and atraumatic.      Right Ear: External ear normal.      Left Ear: External ear normal.      Nose: Nose normal.   Eyes:      Conjunctiva/sclera: Conjunctivae normal.      Pupils: Pupils are equal, round, and reactive to light.   Neck:      Vascular: No JVD.      Trachea: No tracheal deviation.   Cardiovascular:      Rate and Rhythm: Normal rate and regular rhythm.      Heart sounds: Normal heart sounds. No murmur heard.  Pulmonary:      Effort: Pulmonary effort is normal. No respiratory distress.      Breath sounds: Normal breath sounds. No wheezing.   Abdominal:      General: Bowel sounds are normal.      Palpations: Abdomen is soft.      Tenderness: There is no abdominal tenderness.   Musculoskeletal:         General: No deformity. Normal range of motion.      Cervical back: Normal range of motion and neck supple.   Skin:     General: Skin is warm and dry.      Coloration: Skin is not pale.      Findings: No erythema or rash.   Neurological:      Mental Status: She is alert and oriented to person, place, and time.      Cranial Nerves: No cranial nerve deficit.   Psychiatric:         Behavior: Behavior normal.         Thought Content: Thought content normal.       Procedures       Results for orders placed or performed during the hospital encounter of 09/04/23   Comprehensive Metabolic Panel    Specimen: Blood   Result Value Ref Range    Glucose 121 (H) 65 - 99 mg/dL    BUN 12 6 - 20 mg/dL    Creatinine 0.68 0.57 - 1.00 mg/dL    Sodium 139 136 - 145 mmol/L    Potassium 3.8 3.5 - 5.2 mmol/L    Chloride 104 98 - 107 mmol/L    CO2 24.8 22.0 - 29.0 mmol/L    Calcium 9.3 8.6 - 10.5 mg/dL    Total Protein 7.3 6.0 - 8.5 g/dL    Albumin 4.6  3.5 - 5.2 g/dL    ALT (SGPT) 6 1 - 33 U/L    AST (SGOT) 12 1 - 32 U/L    Alkaline Phosphatase 72 39 - 117 U/L    Total Bilirubin 0.5 0.0 - 1.2 mg/dL    Globulin 2.7 gm/dL    A/G Ratio 1.7 g/dL    BUN/Creatinine Ratio 17.6 7.0 - 25.0    Anion Gap 10.2 5.0 - 15.0 mmol/L    eGFR 126.5 >60.0 mL/min/1.73   hCG, Quantitative, Pregnancy    Specimen: Blood   Result Value Ref Range    HCG Quantitative 3,265.00 mIU/mL   Urinalysis With Microscopic If Indicated (No Culture) - Urine, Clean Catch    Specimen: Urine, Clean Catch   Result Value Ref Range    Color, UA Yellow Yellow, Straw    Appearance, UA Clear Clear    pH, UA 6.5 5.0 - 8.0    Specific Gravity, UA 1.010 1.005 - 1.030    Glucose, UA Negative Negative    Ketones, UA Negative Negative    Bilirubin, UA Negative Negative    Blood, UA Negative Negative    Protein, UA Negative Negative    Leuk Esterase, UA Negative Negative    Nitrite, UA Negative Negative    Urobilinogen, UA 0.2 E.U./dL 0.2 - 1.0 E.U./dL   Sedimentation Rate    Specimen: Blood   Result Value Ref Range    Sed Rate 2 0 - 20 mm/hr   C-reactive Protein    Specimen: Blood   Result Value Ref Range    C-Reactive Protein <0.30 0.00 - 0.50 mg/dL   CBC Auto Differential    Specimen: Blood   Result Value Ref Range    WBC 8.13 3.40 - 10.80 10*3/mm3    RBC 4.54 3.77 - 5.28 10*6/mm3    Hemoglobin 14.1 12.0 - 15.9 g/dL    Hematocrit 42.3 34.0 - 46.6 %    MCV 93.2 79.0 - 97.0 fL    MCH 31.1 26.6 - 33.0 pg    MCHC 33.3 31.5 - 35.7 g/dL    RDW 12.3 12.3 - 15.4 %    RDW-SD 42.5 37.0 - 54.0 fl    MPV 9.5 6.0 - 12.0 fL    Platelets 233 140 - 450 10*3/mm3    Neutrophil % 63.6 42.7 - 76.0 %    Lymphocyte % 26.3 19.6 - 45.3 %    Monocyte % 8.7 5.0 - 12.0 %    Eosinophil % 0.5 0.3 - 6.2 %    Basophil % 0.7 0.0 - 1.5 %    Immature Grans % 0.2 0.0 - 0.5 %    Neutrophils, Absolute 5.16 1.70 - 7.00 10*3/mm3    Lymphocytes, Absolute 2.14 0.70 - 3.10 10*3/mm3    Monocytes, Absolute 0.71 0.10 - 0.90 10*3/mm3    Eosinophils, Absolute  0.04 0.00 - 0.40 10*3/mm3    Basophils, Absolute 0.06 0.00 - 0.20 10*3/mm3    Immature Grans, Absolute 0.02 0.00 - 0.05 10*3/mm3    nRBC 0.0 0.0 - 0.2 /100 WBC    RhIg Evaluation    Specimen: Blood   Result Value Ref Range    ABO Type O     RH type Positive     Antibody Screen Negative    Doses of Rh Immune Globulin    Specimen: Blood   Result Value Ref Range    Number of Doses  RhIg is not Indicated, RhIg is not indicated due to the patient's Rh status     RhIg is not indicated due to the patient's Rh status   Green Top (Gel)   Result Value Ref Range    Extra Tube Hold for add-ons.    Lavender Top   Result Value Ref Range    Extra Tube hold for add-on    Gold Top - SST   Result Value Ref Range    Extra Tube Hold for add-ons.    Light Blue Top   Result Value Ref Range    Extra Tube Hold for add-ons.       US Ob < 14 Weeks Single or First Gestation   Final Result       1.  A probable gestational sac is noted within the uterine cavity   however a fetal pole and yolk sac are not identified at this time.   2.  The estimated gestational age is less than 6 weeks based on mean sac   diameter.   3.  A fetal pole and yolk sac are not identified.   4.  No features of ectopic pregnancy.   5.  No acute process seen involving the right ovary.   6.  The left ovary is not identified.   7.  No free fluid in the pelvis.   8.  No subchorionic bleed.   9.  Recommend follow-up evaluation to confirm IUP.   10.  Recommend follow-up evaluation to confirm viability.       This report was finalized on 2023 9:04 PM by Juan Francisco Herring MD.               ED Course  ED Course as of 09/06/23 1907   Mon Sep 04, 2023   2110 US Ob < 14 Weeks Single or First Gestation [SM]      ED Course User Index  [SM] Padma Owens, APRN                                           Medical Decision Making  Problems Addressed:  Vaginal bleeding during pregnancy: complicated acute illness or injury    Amount and/or Complexity of Data Reviewed  Labs:  ordered.  Radiology: ordered. Decision-making details documented in ED Course.        Final diagnoses:   Vaginal bleeding during pregnancy       ED Disposition  ED Disposition       ED Disposition   Discharge    Condition   Stable    Comment   --               Sejal Collins, APRN  [address] [facility] [address]  [phone]    Schedule an appointment as soon as possible for a visit   As needed    Saad Estes III, MD  [address]  [address]  [facility] [address]  [phone]    Schedule an appointment as soon as possible for a visit in 2 days  If symptoms worsen         Medication List      No changes were made to your prescriptions during this visit.            Padma Owens, APRN  09/06/23 [id number]

## 2023-11-02 ENCOUNTER — TRANSCRIBE ORDERS (OUTPATIENT)
Dept: OBSTETRICS AND GYNECOLOGY | Facility: HOSPITAL | Age: 23
End: 2023-11-02
Payer: MEDICAID

## 2023-11-02 DIAGNOSIS — O28.5 ABNORMAL GENETIC TEST DURING PREGNANCY: Primary | ICD-10-CM

## 2023-11-02 DIAGNOSIS — Z87.59 HISTORY OF ECTOPIC PREGNANCY: ICD-10-CM

## 2023-11-02 DIAGNOSIS — Z34.90 PREGNANCY, UNSPECIFIED GESTATIONAL AGE: ICD-10-CM

## 2023-11-07 ENCOUNTER — HOSPITAL ENCOUNTER (OUTPATIENT)
Dept: WOMENS IMAGING | Facility: HOSPITAL | Age: 23
Discharge: HOME | End: 2023-11-07
Admitting: NURSE PRACTITIONER
Payer: MEDICAID

## 2023-11-07 ENCOUNTER — OFFICE VISIT (OUTPATIENT)
Dept: OBSTETRICS AND GYNECOLOGY | Facility: HOSPITAL | Age: 23
End: 2023-11-07
Payer: MEDICAID

## 2023-11-07 VITALS — BODY MASS INDEX: 20.67 KG/M2 | SYSTOLIC BLOOD PRESSURE: 127 MMHG | DIASTOLIC BLOOD PRESSURE: 66 MMHG | WEIGHT: 113 LBS

## 2023-11-07 DIAGNOSIS — O28.5 ABNORMAL GENETIC TEST DURING PREGNANCY: Primary | ICD-10-CM

## 2023-11-07 DIAGNOSIS — Z87.59 HISTORY OF ECTOPIC PREGNANCY: ICD-10-CM

## 2023-11-07 DIAGNOSIS — O28.5 ABNORMAL GENETIC TEST DURING PREGNANCY: ICD-10-CM

## 2023-11-07 DIAGNOSIS — Z34.90 PREGNANCY, UNSPECIFIED GESTATIONAL AGE: ICD-10-CM

## 2023-11-07 PROCEDURE — 76815 OB US LIMITED FETUS(S): CPT

## 2023-11-07 RX ORDER — PRENATAL VIT/IRON FUM/FOLIC AC 27MG-0.8MG
TABLET ORAL DAILY
COMMUNITY

## 2023-11-07 NOTE — PROGRESS NOTES
Patient denies bleeding, leaking fluid or cramping  NIPT suggestive for Klinefelter syndrome  Patients next follow up with Dr. SALEEM Astorga office is 11/15/2023

## 2023-11-10 PROBLEM — O28.5 ABNORMAL GENETIC TEST DURING PREGNANCY: Status: ACTIVE | Noted: 2023-11-10

## 2023-11-10 NOTE — ASSESSMENT & PLAN NOTE
Klinefelter syndrome is a common sex chromosome abnormality occurring in approximately 1 in 500 males and is the result of having an extra X chromosome. Much of the initial medical literature regarding Klinefelter syndrome was based on children and adults who came to medical attention due to physical or mental problems. Newer studies indicate that many XXY males are undiagnosed or are diagnosed as adults due to infertility. I reviewed the physical characteristics of Klinefelter syndrome. XXY males tend to be taller, have small testes, lack facial and body hair, and may have problems with weight. There is an increased risk of learning disabilities and behavioral problems. One-third to two-thirds of males with Klinefelter syndrome have gynecomastia with 10% requiring surgical treatment. There is a wide spectrum of presenting phenotypes with some individuals very mildly affected and others more severely affected.     Cell-free fetal DNA is considered a screening test. This test has an excellent sensitivity and specificity but given the low prevalence of disease, can have a poor predictive value.  I reviewed the possibilities of a false positive, a true positive, confined placental mosaicism and/or fetal mosaicism. I discussed options of prenatal genetic testing in the 2nd trimester including transabdominal amniocentesis for the determination of fetal chromosomal complement. A procedure-related fetal loss rate of 1 in 400 would be quoted with midtrimester amniocentesis.  Ms Nails declined any further testing though she is interested in cord blood being sent after delivery.     Follow up in 5 weeks for detailed anatomic survey is scheduled

## 2023-11-10 NOTE — PROGRESS NOTES
"    Maternal/Fetal Medicine New Patient Note     Name: Alicia Nails    : 2000     MRN: [id number]     Referring Provider: HAIDER Markham    Chief Complaint  NIPT suggestive for Klinefelter syndrome    Subjective     History of Present Illness:  Alicia Nails is a 23 y.o.  15w6d who presents today for evaluation in the setting of NIPS with increased risk of 47XXY   Otherwise uncomplicated pregnancy     DORA: Estimated Date of Delivery: 24     ROS:   As noted in HPI.     History reviewed. No pertinent past medical history.   Past Surgical History:   Procedure Laterality Date    WISDOM TOOTH EXTRACTION  10/16/2017      OB History          4    Para   1    Term   1       0    AB   2    Living   1         SAB   1    IAB   0    Ectopic   1    Molar   0    Multiple   0    Live Births   1          Obstetric Comments   FOB #1 Pregnancy #1 SAB at 7 weeks  FOB #1 Pregnancy #2  at 39 weeks, male  FOB #2 Pregnancy #3 Ectopic at 6 weeks  FOB #2 Pregnancy #4 current               Current Outpatient Medications:     Prenatal Vit-Fe Fumarate-FA (prenatal vitamin 27-0.8) 27-0.8 MG tablet tablet, Take  by mouth Daily., Disp: , Rfl:     Objective     Vital Signs  /66   Wt 51.3 kg (113 lb)   Estimated body mass index is 20.67 kg/m² as calculated from the following:    Height as of 23: 157.5 cm (62\").    Weight as of this encounter: 51.3 kg (113 lb).    Ultrasound Impression:   See viewpoint    Assessment and Plan     Diagnoses and all orders for this visit:    1. Abnormal genetic test during pregnancy (Primary)  Assessment & Plan:  Klinefelter syndrome is a common sex chromosome abnormality occurring in approximately 1 in 500 males and is the result of having an extra X chromosome. Much of the initial medical literature regarding Klinefelter syndrome was based on children and adults who came to medical attention due to physical or mental problems. Newer studies indicate " that many XXY males are undiagnosed or are diagnosed as adults due to infertility. I reviewed the physical characteristics of Klinefelter syndrome. XXY males tend to be taller, have small testes, lack facial and body hair, and may have problems with weight. There is an increased risk of learning disabilities and behavioral problems. One-third to two-thirds of males with Klinefelter syndrome have gynecomastia with 10% requiring surgical treatment. There is a wide spectrum of presenting phenotypes with some individuals very mildly affected and others more severely affected.     Cell-free fetal DNA is considered a screening test. This test has an excellent sensitivity and specificity but given the low prevalence of disease, can have a poor predictive value.  I reviewed the possibilities of a false positive, a true positive, confined placental mosaicism and/or fetal mosaicism. I discussed options of prenatal genetic testing in the 2nd trimester including transabdominal amniocentesis for the determination of fetal chromosomal complement. A procedure-related fetal loss rate of 1 in 400 would be quoted with midtrimester amniocentesis.  Ms Nails declined any further testing though she is interested in cord blood being sent after delivery.     Follow up in 5 weeks for detailed anatomic survey is scheduled           Follow Up  5 weeks     I spent 30 minutes caring for the patient on the day of service. This included: obtaining or reviewing a separately obtained medical history, reviewing patient records, performing a medically appropriate exam and/or evaluation, counseling or educating the patient/family/caregiver, ordering medications, labs, and/or procedures and documenting such in the medical record. This does not include time spent on review and interpretation of other tests such as fetal ultrasound or the performance of other procedures such as amniocentesis or CVS.    Mounika Lambert MD FACOG  Maternal Fetal  Medicine, [facility]     2023

## 2023-12-01 ENCOUNTER — REFERRAL TRIAGE (OUTPATIENT)
Dept: LABOR AND DELIVERY | Facility: HOSPITAL | Age: 23
End: 2023-12-01
Payer: MEDICAID

## 2023-12-07 ENCOUNTER — PATIENT OUTREACH (OUTPATIENT)
Dept: LABOR AND DELIVERY | Facility: HOSPITAL | Age: 23
End: 2023-12-07
Payer: MEDICAID

## 2023-12-07 ENCOUNTER — PATIENT MESSAGE (OUTPATIENT)
Dept: LABOR AND DELIVERY | Facility: HOSPITAL | Age: 23
End: 2023-12-07
Payer: MEDICAID

## 2023-12-07 NOTE — OUTREACH NOTE
[facility]  Intake    Current Estimated Gestational Age: 19w5d    Intake Assessment      Flowsheet Row Responses   Best Method for Contacting Home   Currently Employed Yes   Able to keep appointments as scheduled Yes   Resources Presently Utilizing: None   Maternal Warning Signs Provided   Other: Provided   Other Education Smoking/Vaping Cessation, SNAP Benefits, Transportation Assistance, WIC Benefits, Substance Use Disorder Treatment, Mental Health Services, Insurance benefits/Incentives, How to find a primary care provider, How to find a pediatrician, How to find a dentist, Housing Assistance, Childcare Assistance, HANDS            Learning Assessment      Flowsheet Row Responses   Relationship Patient   Does the learner have any barriers to learning? No Barriers   What is the preferred language of the learner for medical teaching? English   Is an  required? No   How does the learner prefer to learn new concepts? Listening, Reading, Demonstration            Spoke with patient over the phone. Discussed community and insurance extra benefits. Pt states she is not enrolled in WIC, plans to breastfeed, and has ordered a breast pump. Pt sent  program information along with urgent maternal warning signs flyer, [place] Medicaid flyer, and SDOH. Pt denies any needs or concerns at this time.    Referral submitted to the following resources (verbal consent received to submit demographic information):         Tobacco, Alcohol, and Drug History     reports that she has never smoked. She has never used smokeless tobacco.   reports no history of alcohol use.   reports no history of drug use.    Caroline Yusuf RN  Maternity Nurse Navigator    12/7/2023, 17:12 EST        [facility]  Enrollment    Current Estimated Gestational Age: 19w5d    Questions/Answers      Flowsheet Row Responses   Would like to participate? Yes   Date of Intake Visit 12/07/23                Caroline Yusuf RN  Maternity Nurse  Navigator    12/7/2023, 17:12 EST

## 2023-12-09 ENCOUNTER — HOSPITAL ENCOUNTER (EMERGENCY)
Facility: HOSPITAL | Age: 23
Discharge: HOME | End: 2023-12-10
Attending: FAMILY MEDICINE
Payer: MEDICAID

## 2023-12-09 ENCOUNTER — APPOINTMENT (OUTPATIENT)
Dept: ULTRASOUND IMAGING | Facility: HOSPITAL | Age: 23
End: 2023-12-09
Payer: MEDICAID

## 2023-12-09 VITALS
TEMPERATURE: 97.6 F | BODY MASS INDEX: 21.16 KG/M2 | DIASTOLIC BLOOD PRESSURE: 61 MMHG | WEIGHT: 115 LBS | HEART RATE: 90 BPM | RESPIRATION RATE: 16 BRPM | HEIGHT: 62 IN | OXYGEN SATURATION: 100 % | SYSTOLIC BLOOD PRESSURE: 114 MMHG

## 2023-12-09 DIAGNOSIS — Z3A.20 20 WEEKS GESTATION OF PREGNANCY: ICD-10-CM
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
## 2023-12-09 PROCEDURE — 99284 EMERGENCY DEPT VISIT MOD MDM: CPT

## 2023-12-09 PROCEDURE — 76805 OB US >/= 14 WKS SNGL FETUS: CPT

## 2023-12-10 NOTE — ED PROVIDER NOTES
Subjective   History of Present Illness  This is a 23 year old female patient who presents to the ER with chief complaint of fall. PMH significant for being 20 weeks gestation at M2. Earlier today, she fell backwards off of her kitchen counter and injured her back. She is concerned about her baby. She said she is feeling the baby move and hasn't now abdominal pain or cramping. Also denies vaginal bleeding. She called labor and delivery and they advised her to come to the ER.     History provided by:  Patient   used: No        Review of Systems   Constitutional: Negative.  Negative for fever.   HENT: Negative.     Respiratory: Negative.     Cardiovascular: Negative.  Negative for chest pain.   Gastrointestinal: Negative.  Negative for abdominal pain.   Endocrine: Negative.    Genitourinary: Negative.  Negative for dysuria.   Musculoskeletal:  Positive for back pain. Negative for arthralgias, gait problem, joint swelling, myalgias, neck pain and neck stiffness.   Skin: Negative.    Neurological: Negative.    Psychiatric/Behavioral: Negative.     All other systems reviewed and are negative.      No past medical history on file.    No Known Allergies    Past Surgical History:   Procedure Laterality Date    WISDOM TOOTH EXTRACTION  10/16/2017       Family History   Problem Relation Age of Onset    Hypertension Father     Thyroid disease Father     Hypertension Mother     Diabetes Mother     Thyroid disease Mother     Throat cancer Mother     Stomach cancer Mother     Ovarian cancer Mother     Rheum arthritis Mother     Seizures Mother     Asthma Mother     Rheum arthritis Paternal Grandfather     Skin cancer Maternal Grandfather     Atrial fibrillation Maternal Grandfather     Rheum arthritis Maternal Grandfather     Stroke Maternal Grandfather     Asthma Brother        Social History     Socioeconomic History    Marital status:    Tobacco Use    Smoking status: Never    Smokeless tobacco:  Never   Vaping Use    Vaping Use: Never used   Substance and Sexual Activity    Alcohol use: No    Drug use: No    Sexual activity: Yes     Partners: Male     Birth control/protection: None           Objective   Physical Exam  Vitals and nursing note reviewed.   Constitutional:       General: She is not in acute distress.     Appearance: She is well-developed. She is not diaphoretic.   HENT:      Head: Normocephalic and atraumatic.      Right Ear: External ear normal.      Left Ear: External ear normal.      Nose: Nose normal.   Eyes:      Conjunctiva/sclera: Conjunctivae normal.      Pupils: Pupils are equal, round, and reactive to light.   Neck:      Vascular: No JVD.      Trachea: No tracheal deviation.   Cardiovascular:      Rate and Rhythm: Normal rate and regular rhythm.      Heart sounds: Normal heart sounds. No murmur heard.  Pulmonary:      Effort: Pulmonary effort is normal. No respiratory distress.      Breath sounds: Normal breath sounds. No wheezing.   Abdominal:      General: Bowel sounds are normal.      Palpations: Abdomen is soft.      Tenderness: There is no abdominal tenderness. There is no right CVA tenderness, left CVA tenderness, guarding or rebound.   Musculoskeletal:         General: No swelling, tenderness, deformity or signs of injury. Normal range of motion.      Cervical back: Normal range of motion and neck supple.   Skin:     General: Skin is warm and dry.      Coloration: Skin is not pale.      Findings: No erythema or rash.   Neurological:      Mental Status: She is alert and oriented to person, place, and time.      Cranial Nerves: No cranial nerve deficit.   Psychiatric:         Behavior: Behavior normal.         Thought Content: Thought content normal.         Procedures           ED Course  ED Course as of 12/10/23 0434   Sat Dec 09, 2023   1641 Signed out to Anita Contreras NP at shift change.  [MM]      ED Course User Index  [MM] Dottie Lopez PA                                    US Ob 14 + Weeks Single or First Gestation    Result Date: 2023  1. Single live intrauterine pregnancy with size and dates as above. A complete fetal survey is recommended if not performed previously. 2. Normal ALLISON.  This report was finalized on 2023 11:33 PM by Oseas Woodard MD.              Medical Decision Making  This is a 23 year old female patient who presents to the ER with chief complaint of fall. PMH significant for being 20 weeks gestation at M2. Earlier today, she fell backwards off of her kitchen counter and injured her back. She is concerned about her baby. She said she is feeling the baby move and hasn't now abdominal pain or cramping. Also denies vaginal bleeding. She called labor and delivery and they advised her to come to the ER.  Abdominal ultrasound without any abnormal findings.  Patient discharged from ED in stable condition.  Discussed findings with patient.    Problems Addressed:  20 weeks gestation of pregnancy: complicated acute illness or injury  Fall, initial encounter: complicated acute illness or injury    Amount and/or Complexity of Data Reviewed  Radiology: ordered.        Final diagnoses:   Fall, initial encounter   20 weeks gestation of pregnancy       ED Disposition  ED Disposition       ED Disposition   Discharge    Condition   Stable    Comment   --               No follow-up provider specified.       Medication List      No changes were made to your prescriptions during this visit.            Anita Contreras, APRN  12/10/23 0434

## 2023-12-10 NOTE — ED NOTES
Called radiology and spoke with Alec about US that was ended approx. 2 hours ago. Alec states that he will see what he can find out.

## 2023-12-12 ENCOUNTER — HOSPITAL ENCOUNTER (OUTPATIENT)
Dept: WOMENS IMAGING | Facility: HOSPITAL | Age: 23
Discharge: HOME | End: 2023-12-12
Admitting: OBSTETRICS & GYNECOLOGY
Payer: MEDICAID

## 2023-12-12 ENCOUNTER — OFFICE VISIT (OUTPATIENT)
Dept: OBSTETRICS AND GYNECOLOGY | Facility: HOSPITAL | Age: 23
End: 2023-12-12
Payer: MEDICAID

## 2023-12-12 VITALS — BODY MASS INDEX: 21.03 KG/M2 | WEIGHT: 115 LBS | SYSTOLIC BLOOD PRESSURE: 102 MMHG | DIASTOLIC BLOOD PRESSURE: 61 MMHG

## 2023-12-12 DIAGNOSIS — Q27.0 SINGLE UMBILICAL ARTERY: ICD-10-CM

## 2023-12-12 DIAGNOSIS — O28.5 ABNORMAL GENETIC TEST DURING PREGNANCY: ICD-10-CM

## 2023-12-12 DIAGNOSIS — O28.5 ABNORMAL GENETIC TEST DURING PREGNANCY: Primary | ICD-10-CM

## 2023-12-12 PROCEDURE — 76811 OB US DETAILED SNGL FETUS: CPT

## 2023-12-12 NOTE — ASSESSMENT & PLAN NOTE
A single umbilical artery (SUA) or 2-vessel cord was noted on today's ultrasound.  This is observed in 1-2% of normal fetuses and is thought to occur due to a vascular event in the early first trimester.  However, SUA is associated with a variety of adverse pregnancy outcomes including an increased risk of congenital malformations, aneuploidy, fetal growth restriction and stillbirth.  The risk of stillbirth is highest among those fetuses with an anomaly, aneuploidy, or growth restriction.  It is debatable as to whether the risk is increased among fetuses with true isolated SUA.    Today's ultrasound was reassuring for the absence of any other markers of fetal aneuploidy or obvious fetal structural malformations. Patient previously with NIPT with XXY but no other chromosomal abnormalities noted.     Follow up growth scheduled in 8 weeks.

## 2023-12-12 NOTE — LETTER
"2023       No Recipients    Patient: Alicia Nails   YOB: 2000   Date of Visit: 2023       Dear HAIDER Markham,    Thank you for referring Alicia Nails to me for evaluation. Below is a copy of my consult note.    If you have questions, please do not hesitate to call me. I look forward to following Alicia along with you.         Sincerely,        Saulo Dickson MD        CC:   No Recipients    Patient denies bleeding, leaking fluid or contractions  NIPT XXY  Patients next follow up with Dr. SALEEM Astorga is 2024  Patient did state she fell this past weekend, presented to ED and states everything was fine.        Maternal/Fetal Medicine Consult Note   Date: 2023  Name: Alicia Nails    : 2000     MRN: [id number]     Referring Provider: HAIDER Markham    Chief Complaint  NIPT XXY    Subjective     History of Present Illness:  Alicia Nails is a 23 y.o.  20w3d who presents today for follow-up for NIPT with XXY    DORA: Estimated Date of Delivery: 24     ROS:   Otherwise Noted in HPI      Current Outpatient Medications:   •  Prenatal Vit-Fe Fumarate-FA (prenatal vitamin 27-0.8) 27-0.8 MG tablet tablet, Take  by mouth Daily., Disp: , Rfl:     Objective     Vital Signs  /61   Wt 52.2 kg (115 lb)   Estimated body mass index is 21.03 kg/m² as calculated from the following:    Height as of 23: 157.5 cm (62\").    Weight as of this encounter: 52.2 kg (115 lb).    Ultrasound Impression:   See Viewpoint     Assessment and Plan     Alicia Nails is a 23 y.o.  20w3d who presents today for  follow-up for NIPT with XXY    Diagnoses and all orders for this visit:    1. Abnormal genetic test during pregnancy (Primary)  Assessment & Plan:  NIPT positive for XXY.  Anatomy overall appears normal today other than single umbilical artery.  Once again we discussed options for amniocentesis and patient currently " declines.    Orders:  -     [facility]; Future    2. Single umbilical artery  Assessment & Plan:  A single umbilical artery (SUA) or 2-vessel cord was noted on today's ultrasound.  This is observed in 1-2% of normal fetuses and is thought to occur due to a vascular event in the early first trimester.  However, SUA is associated with a variety of adverse pregnancy outcomes including an increased risk of congenital malformations, aneuploidy, fetal growth restriction and stillbirth.  The risk of stillbirth is highest among those fetuses with an anomaly, aneuploidy, or growth restriction.  It is debatable as to whether the risk is increased among fetuses with true isolated SUA.    Today's ultrasound was reassuring for the absence of any other markers of fetal aneuploidy or obvious fetal structural malformations. Patient previously with NIPT with XXY but no other chromosomal abnormalities noted.     Follow up growth scheduled in 8 weeks.            Follow Up  Follow-up growth on in 8 weeks    I spent 10 minutes caring for the patient on the day of service. This included: obtaining or reviewing a separately obtained medical history, reviewing patient records, performing a medically appropriate exam and/or evaluation, counseling or educating the patient/family/caregiver, ordering medications, labs, and/or procedures and documenting such in the medical record. This does not include time spent on review and interpretation of other tests such as fetal ultrasound or the performance of other procedures such as amniocentesis or CVS.      Saulo Dickson MD, FACOG  Maternal Fetal Medicine, [facility]

## 2023-12-12 NOTE — PROGRESS NOTES
"    Maternal/Fetal Medicine Consult Note   Date: 2023  Name: Alicia Nails    : 2000     MRN: [id number]     Referring Provider: HAIDER Markham    Chief Complaint  NIPT XXY    Subjective     History of Present Illness:  Alicia Nails is a 23 y.o.  20w3d who presents today for follow-up for NIPT with XXY    DORA: Estimated Date of Delivery: 24     ROS:   Otherwise Noted in HPI      Current Outpatient Medications:     Prenatal Vit-Fe Fumarate-FA (prenatal vitamin 27-0.8) 27-0.8 MG tablet tablet, Take  by mouth Daily., Disp: , Rfl:     Objective     Vital Signs  /61   Wt 52.2 kg (115 lb)   Estimated body mass index is 21.03 kg/m² as calculated from the following:    Height as of 23: 157.5 cm (62\").    Weight as of this encounter: 52.2 kg (115 lb).    Ultrasound Impression:   See Viewpoint     Assessment and Plan     Alicia Nails is a 23 y.o.  20w3d who presents today for  follow-up for NIPT with XXY    Diagnoses and all orders for this visit:    1. Abnormal genetic test during pregnancy (Primary)  Assessment & Plan:  NIPT positive for XXY.  Anatomy overall appears normal today other than single umbilical artery.  Once again we discussed options for amniocentesis and patient currently declines.    Orders:  -     [facility]; Future    2. Single umbilical artery  Assessment & Plan:  A single umbilical artery (SUA) or 2-vessel cord was noted on today's ultrasound.  This is observed in 1-2% of normal fetuses and is thought to occur due to a vascular event in the early first trimester.  However, SUA is associated with a variety of adverse pregnancy outcomes including an increased risk of congenital malformations, aneuploidy, fetal growth restriction and stillbirth.  The risk of stillbirth is highest among those fetuses with an anomaly, aneuploidy, or growth restriction.  It is debatable as to whether the risk is increased among fetuses with " true isolated SUA.    Today's ultrasound was reassuring for the absence of any other markers of fetal aneuploidy or obvious fetal structural malformations. Patient previously with NIPT with XXY but no other chromosomal abnormalities noted.     Follow up growth scheduled in 8 weeks.            Follow Up  Follow-up growth on in 8 weeks    I spent 10 minutes caring for the patient on the day of service. This included: obtaining or reviewing a separately obtained medical history, reviewing patient records, performing a medically appropriate exam and/or evaluation, counseling or educating the patient/family/caregiver, ordering medications, labs, and/or procedures and documenting such in the medical record. This does not include time spent on review and interpretation of other tests such as fetal ultrasound or the performance of other procedures such as amniocentesis or CVS.      Saulo Dickson MD, FACOG  Maternal Fetal Medicine, [facility]

## 2023-12-12 NOTE — ASSESSMENT & PLAN NOTE
NIPT positive for XXY.  Anatomy overall appears normal today other than single umbilical artery.  Once again we discussed options for amniocentesis and patient currently declines.

## 2023-12-12 NOTE — PROGRESS NOTES
Patient denies bleeding, leaking fluid or contractions  NIPT XXY  Patients next follow up with Dr. SALEEM Astorga is 1/8/2024  Patient did state she fell this past weekend, presented to ED and states everything was fine.

## 2024-01-19 ENCOUNTER — PATIENT OUTREACH (OUTPATIENT)
Dept: LABOR AND DELIVERY | Facility: HOSPITAL | Age: 24
End: 2024-01-19
Payer: MEDICAID

## 2024-01-19 NOTE — OUTREACH NOTE
[facility]  Unable to Reach       Questions/Answers      Flowsheet Row Responses   Pending Outreach Prenatal Check-in   Call Attempt First   Outcome Left message   Next Call Attempt Date 02/02/24            Left message with call back number.    Caroline Yusuf RN  Maternity Nurse Navigator    1/19/2024, 16:10 EST

## 2024-02-06 ENCOUNTER — PATIENT MESSAGE (OUTPATIENT)
Dept: LABOR AND DELIVERY | Facility: HOSPITAL | Age: 24
End: 2024-02-06
Payer: MEDICAID

## 2024-02-06 ENCOUNTER — PATIENT OUTREACH (OUTPATIENT)
Dept: LABOR AND DELIVERY | Facility: HOSPITAL | Age: 24
End: 2024-02-06
Payer: MEDICAID

## 2024-02-06 NOTE — OUTREACH NOTE
[facility]  Unable to Reach       Questions/Answers      Flowsheet Row Responses   Pending Outreach Prenatal Check-in   Call Attempt Second   Outcome Left message   Next Call Attempt Date 02/29/24            Left message with call back number and sent information by [place].    Caroline Yusuf RN  Maternity Nurse Navigator    2/6/2024, 14:31 EST

## 2024-02-07 ENCOUNTER — HOSPITAL ENCOUNTER (OUTPATIENT)
Dept: WOMENS IMAGING | Facility: HOSPITAL | Age: 24
Discharge: HOME | End: 2024-02-07
Admitting: OBSTETRICS & GYNECOLOGY
Payer: MEDICAID

## 2024-02-07 ENCOUNTER — OFFICE VISIT (OUTPATIENT)
Dept: OBSTETRICS AND GYNECOLOGY | Facility: HOSPITAL | Age: 24
End: 2024-02-07
Payer: MEDICAID

## 2024-02-07 VITALS — WEIGHT: 122 LBS | BODY MASS INDEX: 22.31 KG/M2 | DIASTOLIC BLOOD PRESSURE: 67 MMHG | SYSTOLIC BLOOD PRESSURE: 111 MMHG

## 2024-02-07 DIAGNOSIS — Q27.0 SINGLE UMBILICAL ARTERY: Primary | ICD-10-CM

## 2024-02-07 DIAGNOSIS — O28.5 ABNORMAL GENETIC TEST DURING PREGNANCY: ICD-10-CM

## 2024-02-07 PROCEDURE — 76816 OB US FOLLOW-UP PER FETUS: CPT

## 2024-02-07 NOTE — ASSESSMENT & PLAN NOTE
Is seen today for surveillance of growth secondary to NIPT positive for XXY and known single umbilical artery.  Today's ultrasound shows overall fetal growth at the 44th percentile abdominal circumference at the 29th percentile.  Normal ALLISON.  Plan for repeat growth in 4 weeks.

## 2024-02-07 NOTE — LETTER
"2024       No Recipients    Patient: Alicia Nails   YOB: 2000   Date of Visit: 2024       Dear HAIDER Markham,    Thank you for referring Alicia Nails to me for evaluation. Below is a copy of my consult note.    If you have questions, please do not hesitate to call me. I look forward to following Alicia along with you.         Sincerely,        Saulo Dickson MD        CC:   No Recipients    Patient denies bleeding, leaking fluid or contractions  NIPT XXY  Patients next follow up with Dr. SALEEM Astorga is 24        Maternal/Fetal Medicine Consult Note   Date: 2024  Name: Alicia Nails    : 2000     MRN: [id number]     Referring Provider: HAIDER Markham    Chief Complaint  NIPT XXY    Subjective     History of Present Illness:  Alicia Nails is a 23 y.o.  28w4d who presents today for NIPT positive for XXY and single umbilical artery    DORA: Estimated Date of Delivery: 24     ROS:   Otherwise Noted in HPI      Current Outpatient Medications:   •  Prenatal Vit-Fe Fumarate-FA (prenatal vitamin 27-0.8) 27-0.8 MG tablet tablet, Take  by mouth Daily., Disp: , Rfl:     Objective     Vital Signs  /67   Wt 55.3 kg (122 lb)   Estimated body mass index is 22.31 kg/m² as calculated from the following:    Height as of 23: 157.5 cm (62\").    Weight as of this encounter: 55.3 kg (122 lb).    Ultrasound Impression:   See Viewpoint     Assessment and Plan     Alicia Nails is a 23 y.o.  28w4d who presents today for snap    Diagnoses and all orders for this visit:    1. Single umbilical artery (Primary)  Assessment & Plan:  Is seen today for surveillance of growth secondary to NIPT positive for XXY and known single umbilical artery.  Today's ultrasound shows overall fetal growth at the 44th percentile abdominal circumference at the 29th percentile.  Normal ALLISON.  Plan for repeat growth in 4 weeks.    Orders:  -     [facility]; Future    2. Abnormal genetic test during pregnancy  Assessment & Plan:  Patient with previously positive NIPT for X XY.  Patient previously declined diagnostic testing.           Follow Up  Follow-up in 4 weeks    I spent 10 minutes caring for the patient on the day of service. This included: obtaining or reviewing a separately obtained medical history, reviewing patient records, performing a medically appropriate exam and/or evaluation, counseling or educating the patient/family/caregiver, ordering medications, labs, and/or procedures and documenting such in the medical record. This does not include time spent on review and interpretation of other tests such as fetal ultrasound or the performance of other procedures such as amniocentesis or CVS.      Saulo Dickson MD, FACOG  Maternal Fetal Medicine, [facility]    [facility] [place]

## 2024-02-07 NOTE — PROGRESS NOTES
Patient denies bleeding, leaking fluid or contractions  NIPT XXY  Patients next follow up with Dr. SALEEM Astorga is 2/19/24

## 2024-02-29 ENCOUNTER — PATIENT MESSAGE (OUTPATIENT)
Dept: LABOR AND DELIVERY | Facility: HOSPITAL | Age: 24
End: 2024-02-29
Payer: MEDICAID

## 2024-02-29 ENCOUNTER — PATIENT OUTREACH (OUTPATIENT)
Dept: LABOR AND DELIVERY | Facility: HOSPITAL | Age: 24
End: 2024-02-29
Payer: MEDICAID

## 2024-02-29 NOTE — OUTREACH NOTE
[facility]  Unable to Reach       Questions/Answers      Flowsheet Row Responses   Pending Outreach Prenatal Check-in   Call Attempt First   Outcome Left message   Next Call Attempt Date 03/05/24          Left message with call back number and sent [facility] information and EPDS by [facility].    Caroline Yusuf RN  Maternity Nurse Navigator    2/29/2024, 16:19 EST

## 2024-03-02 ENCOUNTER — HOSPITAL ENCOUNTER (OUTPATIENT)
Facility: HOSPITAL | Age: 24
Discharge: HOME | End: 2024-03-03
Attending: OBSTETRICS & GYNECOLOGY | Admitting: OBSTETRICS & GYNECOLOGY
Payer: MEDICAID

## 2024-03-02 LAB
BILIRUB UR QL STRIP: NEGATIVE
CLARITY UR: CLEAR
COLOR UR: YELLOW
DEPRECATED RDW RBC AUTO: 43.5 FL (ref 37–54)
ERYTHROCYTE [DISTWIDTH] IN BLOOD BY AUTOMATED COUNT: 12.9 % (ref 12.3–15.4)
GLUCOSE UR STRIP-MCNC: NEGATIVE MG/DL
HCT VFR BLD AUTO: 34.6 % (ref 34–46.6)
HGB BLD-MCNC: 11.5 G/DL (ref 12–15.9)
HGB UR QL STRIP.AUTO: NEGATIVE
KETONES UR QL STRIP: NEGATIVE
LEUKOCYTE ESTERASE UR QL STRIP.AUTO: NEGATIVE
MCH RBC QN AUTO: 31 PG (ref 26.6–33)
MCHC RBC AUTO-ENTMCNC: 33.2 G/DL (ref 31.5–35.7)
MCV RBC AUTO: 93.3 FL (ref 79–97)
NITRITE UR QL STRIP: NEGATIVE
PH UR STRIP.AUTO: 7 [PH] (ref 5–8)
PLATELET # BLD AUTO: 186 10*3/MM3 (ref 140–450)
PMV BLD AUTO: 9.7 FL (ref 6–12)
PROT UR QL STRIP: NEGATIVE
RBC # BLD AUTO: 3.71 10*6/MM3 (ref 3.77–5.28)
SP GR UR STRIP: 1.02 (ref 1–1.03)
UROBILINOGEN UR QL STRIP: NORMAL
WBC NRBC COR # BLD AUTO: 8.63 10*3/MM3 (ref 3.4–10.8)

## 2024-03-02 PROCEDURE — 85027 COMPLETE CBC AUTOMATED: CPT | Performed by: OBSTETRICS & GYNECOLOGY

## 2024-03-02 PROCEDURE — 81003 URINALYSIS AUTO W/O SCOPE: CPT | Performed by: OBSTETRICS & GYNECOLOGY

## 2024-03-02 PROCEDURE — 87086 URINE CULTURE/COLONY COUNT: CPT | Performed by: OBSTETRICS & GYNECOLOGY

## 2024-03-02 PROCEDURE — 84112 EVAL AMNIOTIC FLUID PROTEIN: CPT | Performed by: OBSTETRICS & GYNECOLOGY

## 2024-03-02 PROCEDURE — G0463 HOSPITAL OUTPT CLINIC VISIT: HCPCS

## 2024-03-02 PROCEDURE — 36415 COLL VENOUS BLD VENIPUNCTURE: CPT | Performed by: OBSTETRICS & GYNECOLOGY

## 2024-03-03 ENCOUNTER — HOSPITAL ENCOUNTER (OUTPATIENT)
Facility: HOSPITAL | Age: 24
Discharge: HOME | End: 2024-03-04
Attending: OBSTETRICS & GYNECOLOGY | Admitting: OBSTETRICS & GYNECOLOGY
Payer: MEDICAID

## 2024-03-03 VITALS
RESPIRATION RATE: 18 BRPM | HEIGHT: 62 IN | BODY MASS INDEX: 22.63 KG/M2 | SYSTOLIC BLOOD PRESSURE: 117 MMHG | WEIGHT: 123 LBS | HEART RATE: 96 BPM | DIASTOLIC BLOOD PRESSURE: 67 MMHG | OXYGEN SATURATION: 96 % | TEMPERATURE: 98.9 F

## 2024-03-03 LAB
A1 MICROGLOB PLACENTAL VAG QL: NEGATIVE
ABO GROUP BLD: NORMAL
BASOPHILS # BLD AUTO: 0.04 10*3/MM3 (ref 0–0.2)
BASOPHILS NFR BLD AUTO: 0.5 % (ref 0–1.5)
BLD GP AB SCN SERPL QL: NEGATIVE
DEPRECATED RDW RBC AUTO: 43.8 FL (ref 37–54)
EOSINOPHIL # BLD AUTO: 0.02 10*3/MM3 (ref 0–0.4)
EOSINOPHIL NFR BLD AUTO: 0.3 % (ref 0.3–6.2)
ERYTHROCYTE [DISTWIDTH] IN BLOOD BY AUTOMATED COUNT: 12.9 % (ref 12.3–15.4)
HCT VFR BLD AUTO: 32.6 % (ref 34–46.6)
HGB BLD-MCNC: 11 G/DL (ref 12–15.9)
IMM GRANULOCYTES # BLD AUTO: 0.03 10*3/MM3 (ref 0–0.05)
IMM GRANULOCYTES NFR BLD AUTO: 0.4 % (ref 0–0.5)
LYMPHOCYTES # BLD AUTO: 1.37 10*3/MM3 (ref 0.7–3.1)
LYMPHOCYTES NFR BLD AUTO: 18.1 % (ref 19.6–45.3)
MCH RBC QN AUTO: 31.3 PG (ref 26.6–33)
MCHC RBC AUTO-ENTMCNC: 33.7 G/DL (ref 31.5–35.7)
MCV RBC AUTO: 92.6 FL (ref 79–97)
MONOCYTES # BLD AUTO: 0.6 10*3/MM3 (ref 0.1–0.9)
MONOCYTES NFR BLD AUTO: 7.9 % (ref 5–12)
NEUTROPHILS NFR BLD AUTO: 5.53 10*3/MM3 (ref 1.7–7)
NEUTROPHILS NFR BLD AUTO: 72.8 % (ref 42.7–76)
NRBC BLD AUTO-RTO: 0 /100 WBC (ref 0–0.2)
PLATELET # BLD AUTO: 184 10*3/MM3 (ref 140–450)
PMV BLD AUTO: 9.6 FL (ref 6–12)
RBC # BLD AUTO: 3.52 10*6/MM3 (ref 3.77–5.28)
RH BLD: POSITIVE
T&S EXPIRATION DATE: NORMAL
WBC NRBC COR # BLD AUTO: 7.59 10*3/MM3 (ref 3.4–10.8)

## 2024-03-03 PROCEDURE — 96372 THER/PROPH/DIAG INJ SC/IM: CPT

## 2024-03-03 PROCEDURE — 80081 OBSTETRIC PANEL INC HIV TSTG: CPT | Performed by: OBSTETRICS & GYNECOLOGY

## 2024-03-03 PROCEDURE — G0463 HOSPITAL OUTPT CLINIC VISIT: HCPCS

## 2024-03-03 PROCEDURE — G0378 HOSPITAL OBSERVATION PER HR: HCPCS

## 2024-03-03 PROCEDURE — 25010000002 TERBUTALINE PER 1 MG: Performed by: OBSTETRICS & GYNECOLOGY

## 2024-03-03 PROCEDURE — P9612 CATHETERIZE FOR URINE SPEC: HCPCS

## 2024-03-03 PROCEDURE — 25010000002 BETAMETHASONE ACET & SOD PHOS PER 4 MG: Performed by: OBSTETRICS & GYNECOLOGY

## 2024-03-03 PROCEDURE — 86803 HEPATITIS C AB TEST: CPT | Performed by: OBSTETRICS & GYNECOLOGY

## 2024-03-03 PROCEDURE — 59025 FETAL NON-STRESS TEST: CPT

## 2024-03-03 RX ORDER — TERBUTALINE SULFATE 1 MG/ML
0.25 INJECTION, SOLUTION SUBCUTANEOUS ONCE AS NEEDED
Status: COMPLETED | OUTPATIENT
Start: 2024-03-03 | End: 2024-03-03

## 2024-03-03 RX ORDER — BETAMETHASONE SODIUM PHOSPHATE AND BETAMETHASONE ACETATE 3; 3 MG/ML; MG/ML
12 INJECTION, SUSPENSION INTRA-ARTICULAR; INTRALESIONAL; INTRAMUSCULAR; SOFT TISSUE EVERY 12 HOURS
Status: COMPLETED | OUTPATIENT
Start: 2024-03-03 | End: 2024-03-04

## 2024-03-03 RX ORDER — CALCIUM CARBONATE 500 MG/1
3 TABLET, CHEWABLE ORAL 3 TIMES DAILY PRN
Status: DISCONTINUED | OUTPATIENT
Start: 2024-03-03 | End: 2024-03-04 | Stop reason: HOSPADM

## 2024-03-03 RX ORDER — NIFEDIPINE 10 MG/1
10 CAPSULE ORAL EVERY 6 HOURS SCHEDULED
Status: DISCONTINUED | OUTPATIENT
Start: 2024-03-04 | End: 2024-03-03

## 2024-03-03 RX ORDER — ACETAMINOPHEN 500 MG
1000 TABLET ORAL EVERY 6 HOURS PRN
Status: DISCONTINUED | OUTPATIENT
Start: 2024-03-03 | End: 2024-03-04 | Stop reason: HOSPADM

## 2024-03-03 RX ORDER — TERBUTALINE SULFATE 1 MG/ML
0.25 INJECTION, SOLUTION SUBCUTANEOUS ONCE
Status: COMPLETED | OUTPATIENT
Start: 2024-03-03 | End: 2024-03-03

## 2024-03-03 RX ORDER — HYDROXYZINE HYDROCHLORIDE 25 MG/1
50 TABLET, FILM COATED ORAL NIGHTLY PRN
Status: DISCONTINUED | OUTPATIENT
Start: 2024-03-03 | End: 2024-03-04 | Stop reason: HOSPADM

## 2024-03-03 RX ORDER — PRENATAL VIT/IRON FUM/FOLIC AC 27MG-0.8MG
1 TABLET ORAL DAILY
Status: DISCONTINUED | OUTPATIENT
Start: 2024-03-04 | End: 2024-03-04

## 2024-03-03 RX ORDER — NIFEDIPINE 10 MG/1
10 CAPSULE ORAL EVERY 6 HOURS SCHEDULED
Status: DISCONTINUED | OUTPATIENT
Start: 2024-03-03 | End: 2024-03-04

## 2024-03-03 RX ADMIN — HYDROXYZINE HYDROCHLORIDE 50 MG: 25 TABLET, FILM COATED ORAL at 22:36

## 2024-03-03 RX ADMIN — TERBUTALINE SULFATE 0.25 MG: 1 INJECTION SUBCUTANEOUS at 21:35

## 2024-03-03 RX ADMIN — TERBUTALINE SULFATE 0.25 MG: 1 INJECTION SUBCUTANEOUS at 02:16

## 2024-03-03 RX ADMIN — NIFEDIPINE 10 MG: 10 CAPSULE ORAL at 22:07

## 2024-03-03 RX ADMIN — ACETAMINOPHEN 1000 MG: 500 TABLET ORAL at 21:36

## 2024-03-03 RX ADMIN — BETAMETHASONE SODIUM PHOSPHATE AND BETAMETHASONE ACETATE 12 MG: 3; 3 INJECTION, SUSPENSION INTRA-ARTICULAR; INTRALESIONAL; INTRAMUSCULAR at 22:36

## 2024-03-03 RX ADMIN — TERBUTALINE SULFATE 0.25 MG: 1 INJECTION SUBCUTANEOUS at 00:22

## 2024-03-03 NOTE — NURSING NOTE
PT. REPORTS RELIEF OF S/S, NO CERVICAL CHANGE NOTED. DISCHARGE INSTRUCTIONS, FETAL KICK COUNTS, EMERGENCY WARNINGS, AND S/S OF LABOR DISCUSSED WITH PT. UNDERSTANDING VERBALIZED. PT. HAS APPOINTMENT IN [facility] ON 3/6 AND WITH [facility] ON 3/8.

## 2024-03-03 NOTE — NON STRESS TEST
"  Alicia Nails, a  at 32w1d with an DORA of 2024, Date entered prior to episode creation, was seen at [facility] DELIV[facility] for a nonstress test.    Chief Complaint   Patient presents with    Contractions     PT. C/O UC, DESCRIBING AS \"PERIOD LIKE CRAMPS.\" STATES SHE THINKS SHE COULD BE LEAKING FLUID WHEN  COUGHING, DENIES VB.        Patient Active Problem List   Diagnosis    Premature cervical dilation    Abdominal pain during pregnancy in third trimester     labor    Pregnancy    Postpartum care following vaginal delivery    Ectopic pregnancy    Abnormal genetic test during pregnancy    Single umbilical artery       Start Time: 32  Stop Time: 52    Interpretation A  Nonstress Test Interpretation A: Reactive  Comments A: VERIFIED BY BRUNO QUINONES RN                "

## 2024-03-04 VITALS
RESPIRATION RATE: 18 BRPM | OXYGEN SATURATION: 98 % | HEIGHT: 62 IN | BODY MASS INDEX: 22.08 KG/M2 | DIASTOLIC BLOOD PRESSURE: 65 MMHG | HEART RATE: 100 BPM | WEIGHT: 120 LBS | SYSTOLIC BLOOD PRESSURE: 106 MMHG | TEMPERATURE: 97.8 F

## 2024-03-04 LAB
BACTERIA SPEC AEROBE CULT: NO GROWTH
HBV SURFACE AG SERPL QL IA: NORMAL
HCV AB SER DONR QL: NORMAL
HIV 1+2 AB+HIV1 P24 AG SERPL QL IA: NORMAL
RPR SER QL: NORMAL

## 2024-03-04 PROCEDURE — G0378 HOSPITAL OBSERVATION PER HR: HCPCS

## 2024-03-04 PROCEDURE — 59025 FETAL NON-STRESS TEST: CPT

## 2024-03-04 PROCEDURE — 25010000002 BETAMETHASONE ACET & SOD PHOS PER 4 MG: Performed by: OBSTETRICS & GYNECOLOGY

## 2024-03-04 PROCEDURE — 96372 THER/PROPH/DIAG INJ SC/IM: CPT

## 2024-03-04 RX ORDER — NIFEDIPINE 30 MG
30 TABLET, EXTENDED RELEASE ORAL ONCE
Status: COMPLETED | OUTPATIENT
Start: 2024-03-04 | End: 2024-03-04

## 2024-03-04 RX ORDER — PRENATAL VIT/IRON FUM/FOLIC AC 27MG-0.8MG
1 TABLET ORAL NIGHTLY
Status: DISCONTINUED | OUTPATIENT
Start: 2024-03-04 | End: 2024-03-04 | Stop reason: HOSPADM

## 2024-03-04 RX ORDER — NIFEDIPINE 30 MG/1
30 TABLET, EXTENDED RELEASE ORAL DAILY
Qty: 30 TABLET | Refills: 0 | Status: SHIPPED | OUTPATIENT
Start: 2024-03-04

## 2024-03-04 RX ORDER — PRENATAL VIT/IRON FUM/FOLIC AC 27MG-0.8MG
1 TABLET ORAL DAILY
Status: CANCELLED | OUTPATIENT
Start: 2024-03-04

## 2024-03-04 RX ADMIN — NIFEDIPINE 10 MG: 10 CAPSULE ORAL at 03:37

## 2024-03-04 RX ADMIN — BETAMETHASONE SODIUM PHOSPHATE AND BETAMETHASONE ACETATE 12 MG: 3; 3 INJECTION, SUSPENSION INTRA-ARTICULAR; INTRALESIONAL; INTRAMUSCULAR at 10:55

## 2024-03-04 RX ADMIN — NIFEDIPINE 30 MG: 30 TABLET, EXTENDED RELEASE ORAL at 10:56

## 2024-03-04 NOTE — DISCHARGE SUMMARY
Date of Discharge: 24     Discharge Diagnosis:   Abdominal pain in pregnancy   labor  32-week intrauterine pregnancy    Problem List:    Abdominal pain during pregnancy in third trimester     labor      Presenting Problem/History of Present Illness  Abdominal pain during pregnancy in third trimester [O26.893, R10.9]   labor [O60.00]      Hospital Course  Patient is a 23 y.o. female presented with contractions.  She was given IV fluids and oral Procardia and the contractions resolved.  Her cervix did change possibly to 1 to 2 cm 40 to 50% effaced and -3 station so she was observed overnight and given a course of betamethasone.  After this she was observed and discharged home in stable condition.  Procedures Performed         Consults:   Consults       No orders found for last 30 day(s).            Pertinent Test Results:    Condition on Discharge: Stable  Vital Signs  Temp:  [98.2 °F (36.8 °C)] 98.2 °F (36.8 °C)  Heart Rate:  [] 115  Resp:  [18] 18  BP: ()/(55-68) 106/65    Discharge Disposition      Discharge Medications     Discharge Medications        ASK your doctor about these medications        Instructions Start Date   prenatal vitamin 27-0.8 27-0.8 MG tablet tablet   1 tablet, Oral, Daily               Discharge Diet       Activity at Discharge      Follow-up Appointments  Future Appointments   Date Time Provider Department Center   3/6/2024  1:45 PM [facility] US 1 [facility]  [facility]   3/6/2024  1:45 PM [facility] FOLLOW UP [facility] None           Time: Discharge 15 min   Patient had vaginal delivery 5/11/18.  Patient asking for list of medications that are safe with breastfeeding.  Patient instructed to call her pediatrician for medications that are safe with breastfeeding.  Patient asking if she can go swimming.  Patient is no longer bleeding.  Patient informed she may go swimming.  Patient asking about exercising. Patient instructed to start with walking.  Patient states she is doing kegal exercises, denies leaking urine. Patient has post partum appointment 6/25/18.  Informed that Dr. Morton will do exam and if she is healed from delivery she may resume normal activities.  Patient has not had intercourse yet.  Questions answered.  Patient instructed to keep post partum appointment.

## 2024-03-04 NOTE — NON STRESS TEST
Alicia Nails, a  at 32w2d with an DORA of 2024, Date entered prior to episode creation, was seen at [facility] DELIVERY for a nonstress test.    Chief Complaint   Patient presents with    Abdominal Cramping     PRESENTS TO L&D WITH C/O ABD CRAMPING EVERY 4-5 MIN. DENIES VB OR LOF. STATES BABY IS MOVING WELL.  WAS SEEN IN L&D LAST NIGHT FOR CTX AND RECEIVED 2 DOSES OF TERB, HAD MATERNITY PICS DONE TODAY AND CLEANED HER ENTIRE HOUSE. HX  OF  PTL WITH G1.        Patient Active Problem List   Diagnosis    Premature cervical dilation    Abdominal pain during pregnancy in third trimester     labor    Pregnancy    Postpartum care following vaginal delivery    Ectopic pregnancy    Abnormal genetic test during pregnancy    Single umbilical artery       Start Time: 900  Stop Time: 930    Interpretation A  Nonstress Test Interpretation A: Reactive  Comments A: VERIFIED PER KARL SAENZ RN.

## 2024-03-04 NOTE — H&P
Patient Care Team:  Sejal Collins APRN as PCP - General (Nurse Practitioner)  Saad Estes III, MD as Obstetrician (Obstetrics and Gynecology)  Caroline Matos, RN as Nurse Navigator (Obstetrics)    Chief complaint contractions    Subjective     Patient is a 23 y.o. female  4 para 1 at 32 weeks and 2 days presents with contractions last night.  She was seen over the weekend and given 2 doses of Brethine and the contractions subsequently resolved.  She represented last night and her cervix is changed slightly to 1 to 2 cm 50% effaced and -3 station.  She was started on oral Procardia and this morning she seems to be feeling a lot better.  There is good fetal movement no vaginal bleeding or loss of fluid.    Review of Systems   Pertinent items are noted in HPI    History  Past Medical History:   Diagnosis Date    Ectopic pregnancy      Past Surgical History:   Procedure Laterality Date    ECTOPIC PREGNANCY  2021    WISDOM TOOTH EXTRACTION  10/16/2017     Family History   Problem Relation Age of Onset    Hypertension Father     Thyroid disease Father     Hypertension Mother     Diabetes Mother     Thyroid disease Mother     Throat cancer Mother     Stomach cancer Mother     Ovarian cancer Mother     Rheum arthritis Mother     Seizures Mother     Asthma Mother     Rheum arthritis Paternal Grandfather     Skin cancer Maternal Grandfather     Atrial fibrillation Maternal Grandfather     Rheum arthritis Maternal Grandfather     Stroke Maternal Grandfather     Asthma Brother      Social History     Tobacco Use    Smoking status: Never     Passive exposure: Never    Smokeless tobacco: Never   Vaping Use    Vaping status: Never Used   Substance Use Topics    Alcohol use: No    Drug use: No     E-cigarette/Vaping    E-cigarette/Vaping Use Never User     Passive Exposure No     Counseling Given No      E-cigarette/Vaping Substances    Nicotine No     THC No     CBD No     Flavoring No      Medications  Prior to Admission   Medication Sig Dispense Refill Last Dose    Prenatal Vit-Fe Fumarate-FA (prenatal vitamin 27-0.8) 27-0.8 MG tablet tablet Take 1 tablet by mouth Daily.   Unknown     Allergies:  Patient has no known allergies.    Objective     Vital Signs  Temp:  [98.2 °F (36.8 °C)] 98.2 °F (36.8 °C)  Heart Rate:  [] 115  Resp:  [18] 18  BP: ()/(55-68) 106/65    Physical Exam:      General Appearance:  Alert, cooperative, in no acute distress   Head:  Normocephalic, without obvious abnormality, atraumatic   Eyes:  Lids and lashes normal, conjunctivae and sclerae normal, no icterus, no pallor, corneas clear, PERRLA   Ears:  Ears appear intact with no abnormalities noted   Throat:  No oral lesions, no thrush, oral mucosa moist   Neck:  No adenopathy, supple, trachea midline, no thyromegaly, no carotid bruit, no JVD   Back:  No kyphosis present, no scoliosis present, no skin lesions, erythema or scars, no tenderness to percussion, or palpation, range of motion normal   Lungs:  Clear to auscultation,respirations regular, even and unlabored    Heart:  Regular rhythm and normal rate, normal S1 and S2, no murmur, no gallop, no rub, no click   Breast Exam:  Deferred   Abdomen:  Normal bowel sounds, no masses, no organomegaly, soft non-tender, non-distended, no guarding, no rebound tenderness   Genitalia:  Deferred   Extremities:  Moves all extremities well, no edema, no cyanosis, no redness   Pulses:  Pulses palpable and equal bilaterally   Skin:  No bleeding, bruising or rash   Lymph nodes:  No palpable adenopathy            Results Review:    I reviewed the patient's new clinical results.    Assessment & Plan       Abdominal pain during pregnancy in third trimester     labor  She is stable on oral Procardia.  I am going to send her home on Procardia XL 30 mg daily.  We will finish her course of betamethasone out here as well.    I discussed the patient's findings and my recommendations with patient  and family.     Tj Alejo,   03/04/24  08:58 EST

## 2024-03-04 NOTE — NON STRESS TEST
Alicia Nails, a  at 32w1d with an DORA of 2024, Date entered prior to episode creation, was seen at [facility] DELIVERY for a nonstress test.    Chief Complaint   Patient presents with    Abdominal Cramping     PRESENTS TO L&D WITH C/O ABD CRAMPING EVERY 4-5 MIN. DENIES VB OR LOF. STATES BABY IS MOVING WELL.  WAS SEEN IN L&D LAST NIGHT FOR CTX AND RECEIVED 2 DOSES OF TERB, HAD MATERNITY PICS DONE TODAY AND CLEANED HER ENTIRE HOUSE. HX  OF  PTL WITH G1.        Patient Active Problem List   Diagnosis    Premature cervical dilation    Abdominal pain during pregnancy in third trimester     labor    Pregnancy    Postpartum care following vaginal delivery    Ectopic pregnancy    Abnormal genetic test during pregnancy    Single umbilical artery       Start Time:   Stop Time:     Interpretation A  Nonstress Test Interpretation A: Reactive  Comments A: VERIFIED PER KARL SAENZ RN.

## 2024-03-04 NOTE — PHARMACY PATIENT ASSISTANCE
Pharmacy checked on cost of nifedipine 10mg capsules. A prior authorization was required per patient's insurance. Pharmacy has gotten the prior authorization approved for $0 copay.    Thank you,  Alicia Wagner, PharmD

## 2024-03-04 NOTE — PLAN OF CARE
Goal Outcome Evaluation:  Plan of Care Reviewed With: patient        Progress: improving  Outcome Evaluation: OBSERVATION AND STEROIDS TONIGHT. NST REACTIVE.

## 2024-03-05 ENCOUNTER — PATIENT OUTREACH (OUTPATIENT)
Dept: LABOR AND DELIVERY | Facility: HOSPITAL | Age: 24
End: 2024-03-05
Payer: MEDICAID

## 2024-03-05 LAB — RUBV IGG SERPL IA-ACNC: 1.2 INDEX

## 2024-03-05 NOTE — OUTREACH NOTE
[facility]  Unable to Reach       Questions/Answers      Flowsheet Row Responses   Pending Outreach Prenatal Check-in   Call Attempt Second   Outcome Left message   Next Call Attempt Date 03/12/24            Left message with call back number.    Caroline Yusuf RN  Maternity Nurse Navigator    3/5/2024, 14:48 EST

## 2024-03-06 ENCOUNTER — OFFICE VISIT (OUTPATIENT)
Dept: OBSTETRICS AND GYNECOLOGY | Facility: HOSPITAL | Age: 24
End: 2024-03-06
Payer: MEDICAID

## 2024-03-06 ENCOUNTER — HOSPITAL ENCOUNTER (OUTPATIENT)
Dept: WOMENS IMAGING | Facility: HOSPITAL | Age: 24
Discharge: HOME | End: 2024-03-06
Admitting: OBSTETRICS & GYNECOLOGY
Payer: MEDICAID

## 2024-03-06 VITALS — WEIGHT: 120.4 LBS | SYSTOLIC BLOOD PRESSURE: 111 MMHG | BODY MASS INDEX: 22.02 KG/M2 | DIASTOLIC BLOOD PRESSURE: 52 MMHG

## 2024-03-06 DIAGNOSIS — Q27.0 SINGLE UMBILICAL ARTERY: ICD-10-CM

## 2024-03-06 DIAGNOSIS — O60.03 PRETERM LABOR IN THIRD TRIMESTER WITHOUT DELIVERY: ICD-10-CM

## 2024-03-06 DIAGNOSIS — O28.5 ABNORMAL GENETIC TEST DURING PREGNANCY: Primary | ICD-10-CM

## 2024-03-06 DIAGNOSIS — O36.5930 IUGR (INTRAUTERINE GROWTH RETARDATION) AFFECTING MOTHER, THIRD TRIMESTER, NOT APPLICABLE OR UNSPECIFIED FETUS: ICD-10-CM

## 2024-03-06 PROBLEM — O00.90 ECTOPIC PREGNANCY: Status: RESOLVED | Noted: 2021-12-14 | Resolved: 2024-03-06

## 2024-03-06 PROCEDURE — 76819 FETAL BIOPHYS PROFIL W/O NST: CPT

## 2024-03-06 PROCEDURE — 76816 OB US FOLLOW-UP PER FETUS: CPT

## 2024-03-06 PROCEDURE — 76820 UMBILICAL ARTERY ECHO: CPT

## 2024-03-06 NOTE — ASSESSMENT & PLAN NOTE
Patient admitted at the beginning of the month for  labor she was noted to be approximately 60% effaced and 1 to 2 cm dilated.  Contractions stopped with toco lysis and the patient received corticosteroids for fetal lung maturation.    Patient was counseled extensively regarding signs and symptoms of  labor and will contact her provider immediately if she notices  labor she understands that contractions do not have to become hard to be  labor but if they become regular she needs to contact her provider.

## 2024-03-06 NOTE — ASSESSMENT & PLAN NOTE
There is considerable variability in the definition of what constitutes a growth-restricted fetus.  Definitions have included estimated fetal weight below the 3rd percentile, below the 5th percentile or below the 10th percentile for gestational age.  Subnormal fetal growth may be a consequence of error in dating criteria or result from chronic uteroplacental insufficiency, exposure to drugs or environmental agents, congenital infections or intrinsic genetic limitations of growth potential.  Early or symmetric IUGR can suggest chromosomal abnormalities such as trisomies, triploidies or maternal uniparental disomy.  Asymmetrical IUGR may result from nutritional compromise with sparing of head growth and be associated with tobacco abuse, chronic placental abnormalities such as abruption or as a harbinger of preeclampsia.      Current recommendations by Copel and [employer]oc[employer] for the management of fetal growth restriction, (Obstet Gynecol May 2014) suggest for patients at term (37 0/7 weeks' gestation or more) that delivery be affected if the estimated fetal weight is less than the 5th percentile, or oligohydramnios is present with an estimated fetal weight of less than the 10th percentile, or with worsening  testing results.  Otherwise, delivery can be delayed as long as  testing is reassuring with plans for elective delivery at 39 weeks' gestation.  Induction of labor can be attempted depending on the indication for delivery.  If the diagnosis of IUGR is  (before 37 weeks' gestation), it is not possible to make absolute recommendations on timing unless other  testing is reassuring based on the data from the Growth Restriction Intervention Trial (GRIT), a multinational prospective randomized study (Lancet 2004).  It is suggested that fetal growth be monitored every two weeks with consideration for delivery if no growth occurs over the course of 14 days.  The patient should receive twice  weekly nonstress testing with amniotic fluid volume assessment.  Further, the literature would support weekly Doppler ultrasonography of the umbilical artery in the setting of intrauterine growth retardation with hospitalization for evidence of absent or reverse end-diastolic velocities.  Delivery should also be considered for worsening  testing (biophysical profile score less than 6/8 or nonreassuring fetal heart rate tracing).  When  delivery before 32 weeks' gestation is imminent, consider intravenous magnesium sulfate administration for fetal neuroprotection.      Ultrasound today demonstrates a fetus that overall growth at the 18th percentile but abdominal circumference at the 1st percentile.  No fetal abnormalities were seen.  Amniotic fluid volume was normal as were umbilical artery Dopplers.  BPP was 8 out of 8.  This is consistent with intrauterine growth restriction.    We would recommend bedrest for this patient.  We would recommend twice-weekly  testing.  We will see the patient at the end of each week and perform BPP and Dopplers.  We will perform fetal growth measurements every 2 weeks.  We would recommend  testing either NST or BPP at Dr. Astorga's office early in the week on Monday or Tuesday.  Patient was counseled to be at bedrest and to increase nutrition.  Patient does not smoke.  Patient was counseled regarding fetal movement and will contact her provider immediately if she notices any decreased fetal movement.  Unless there is marked improvement in fetal growth we would recommend delivery at 37 weeks gestation, or earlier if other complications arise.

## 2024-03-06 NOTE — PROGRESS NOTES
"Next f/u with SALEEM Astorga in [place] on 3/8/24, NIPT + Klinefelter syndrome  Noted recent admission to the hospital for PTL was 1-2cm/50/-3, s/p betamethasone 3/3 and 3/4  Pt reports \" a few contractions and some back pain today, but not like it was when admitted to the hospital\".   "

## 2024-03-06 NOTE — ASSESSMENT & PLAN NOTE
Patient returns today with having had a NIPT positive for Klinefelter X XY.  Ultrasound today demonstrates a normal fetus with no abnormality seen.  The majority of Clinefelter fetuses appear normal on prenatal ultrasound.  We would recommend sending cord bloods for karyotype to the time of delivery.

## 2024-03-06 NOTE — PROGRESS NOTES
"Documentation of the ultrasound findings, images, and interpretations will be available in the patient's Viewpoint report which is located in the imaging tab in chart review.    Maternal/Fetal Medicine Follow Up  Note     Name: Alicia Nails    : 2000     MRN: [id number]     Referring Provider: HAIDER Markham    Chief Complaint  NIPT + Klinefelter     Subjective     History of Present Illness:  Alicia Naisl is a 23 y.o.  32w4d who presents today for SUA    DORA: Estimated Date of Delivery: 24     ROS:   As noted in HPI.     Objective     Vital Signs  /52   Wt 54.6 kg (120 lb 6.4 oz)   Estimated body mass index is 22.02 kg/m² as calculated from the following:    Height as of 3/3/24: 157.5 cm (62\").    Weight as of this encounter: 54.6 kg (120 lb 6.4 oz).    Physical Exam    Ultrasound Impression:   See Viewpoint    Assessment and Plan     Alicia Nails is a 23 y.o.  32w4d who presents today for SUA    Diagnoses and all orders for this visit:    1. Abnormal genetic test during pregnancy (Primary)  Assessment & Plan:  Patient returns today with having had a NIPT positive for Klinefelter X XY.  Ultrasound today demonstrates a normal fetus with no abnormality seen.  The majority of Clinefelter fetuses appear normal on prenatal ultrasound.  We would recommend sending cord bloods for karyotype to the time of delivery.      2. Single umbilical artery    3.  labor in third trimester without delivery  Assessment & Plan:  Patient admitted at the beginning of the month for  labor she was noted to be approximately 60% effaced and 1 to 2 cm dilated.  Contractions stopped with toco lysis and the patient received corticosteroids for fetal lung maturation.    Patient was counseled extensively regarding signs and symptoms of  labor and will contact her provider immediately if she notices  labor she understands that contractions do not have to become hard " to be  labor but if they become regular she needs to contact her provider.      4. IUGR (intrauterine growth retardation) affecting mother, third trimester, not applicable or unspecified fetus  Assessment & Plan:  There is considerable variability in the definition of what constitutes a growth-restricted fetus.  Definitions have included estimated fetal weight below the 3rd percentile, below the 5th percentile or below the 10th percentile for gestational age.  Subnormal fetal growth may be a consequence of error in dating criteria or result from chronic uteroplacental insufficiency, exposure to drugs or environmental agents, congenital infections or intrinsic genetic limitations of growth potential.  Early or symmetric IUGR can suggest chromosomal abnormalities such as trisomies, triploidies or maternal uniparental disomy.  Asymmetrical IUGR may result from nutritional compromise with sparing of head growth and be associated with tobacco abuse, chronic placental abnormalities such as abruption or as a harbinger of preeclampsia.      Current recommendations by Sahral and Judy for the management of fetal growth restriction, (Obstet Gynecol May 2014) suggest for patients at term (37 0/7 weeks' gestation or more) that delivery be affected if the estimated fetal weight is less than the 5th percentile, or oligohydramnios is present with an estimated fetal weight of less than the 10th percentile, or with worsening  testing results.  Otherwise, delivery can be delayed as long as  testing is reassuring with plans for elective delivery at 39 weeks' gestation.  Induction of labor can be attempted depending on the indication for delivery.  If the diagnosis of IUGR is  (before 37 weeks' gestation), it is not possible to make absolute recommendations on timing unless other  testing is reassuring based on the data from the Growth Restriction Intervention Trial (GRIT), a multinational  prospective randomized study (Lancet 2004).  It is suggested that fetal growth be monitored every two weeks with consideration for delivery if no growth occurs over the course of 14 days.  The patient should receive twice weekly nonstress testing with amniotic fluid volume assessment.  Further, the literature would support weekly Doppler ultrasonography of the umbilical artery in the setting of intrauterine growth retardation with hospitalization for evidence of absent or reverse end-diastolic velocities.  Delivery should also be considered for worsening  testing (biophysical profile score less than 6/8 or nonreassuring fetal heart rate tracing).  When  delivery before 32 weeks' gestation is imminent, consider intravenous magnesium sulfate administration for fetal neuroprotection.      Ultrasound today demonstrates a fetus that overall growth at the 18th percentile but abdominal circumference at the 1st percentile.  No fetal abnormalities were seen.  Amniotic fluid volume was normal as were umbilical artery Dopplers.  BPP was 8 out of 8.  This is consistent with intrauterine growth restriction.    We would recommend bedrest for this patient.  We would recommend twice-weekly  testing.  We will see the patient at the end of each week and perform BPP and Dopplers.  We will perform fetal growth measurements every 2 weeks.  We would recommend  testing either NST or BPP at Dr. Astorga's office early in the week on Monday or Tuesday.  Patient was counseled to be at bedrest and to increase nutrition.  Patient does not smoke.  Patient was counseled regarding fetal movement and will contact her provider immediately if she notices any decreased fetal movement.  Unless there is marked improvement in fetal growth we would recommend delivery at 37 weeks gestation, or earlier if other complications arise.           Follow Up  No follow-ups on file.    I spent 20 minutes caring for the patient on  the day of service. This included: obtaining or reviewing a separately obtained medical history, reviewing patient records, performing a medically appropriate exam and/or evaluation, counseling or educating the patient/family/caregiver, ordering medications, labs, and/or procedures and documenting such in the medical record. This does not include time spent on review and interpretation of other tests such as fetal ultrasound or the performance of other procedures such as amniocentesis or CVS.      Douglas A. Milligan, MD  Maternal Fetal Medicine, [facility]     2024

## 2024-03-12 ENCOUNTER — OFFICE VISIT (OUTPATIENT)
Dept: OBSTETRICS AND GYNECOLOGY | Facility: HOSPITAL | Age: 24
End: 2024-03-12
Payer: MEDICAID

## 2024-03-12 ENCOUNTER — HOSPITAL ENCOUNTER (OUTPATIENT)
Dept: WOMENS IMAGING | Facility: HOSPITAL | Age: 24
Discharge: HOME | End: 2024-03-12
Admitting: OBSTETRICS & GYNECOLOGY
Payer: MEDICAID

## 2024-03-12 VITALS — SYSTOLIC BLOOD PRESSURE: 109 MMHG | WEIGHT: 120 LBS | DIASTOLIC BLOOD PRESSURE: 67 MMHG | BODY MASS INDEX: 21.95 KG/M2

## 2024-03-12 DIAGNOSIS — O28.5 ABNORMAL GENETIC TEST DURING PREGNANCY: ICD-10-CM

## 2024-03-12 DIAGNOSIS — Q27.0 SINGLE UMBILICAL ARTERY: ICD-10-CM

## 2024-03-12 DIAGNOSIS — O60.03 PRETERM LABOR IN THIRD TRIMESTER WITHOUT DELIVERY: ICD-10-CM

## 2024-03-12 DIAGNOSIS — O36.5930 IUGR (INTRAUTERINE GROWTH RETARDATION) AFFECTING MOTHER, THIRD TRIMESTER, NOT APPLICABLE OR UNSPECIFIED FETUS: Primary | ICD-10-CM

## 2024-03-12 DIAGNOSIS — O36.5930 IUGR (INTRAUTERINE GROWTH RETARDATION) AFFECTING MOTHER, THIRD TRIMESTER, NOT APPLICABLE OR UNSPECIFIED FETUS: ICD-10-CM

## 2024-03-12 DIAGNOSIS — Z3A.33 33 WEEKS GESTATION OF PREGNANCY: ICD-10-CM

## 2024-03-12 PROCEDURE — 76820 UMBILICAL ARTERY ECHO: CPT

## 2024-03-12 PROCEDURE — 76819 FETAL BIOPHYS PROFIL W/O NST: CPT | Performed by: OBSTETRICS & GYNECOLOGY

## 2024-03-12 PROCEDURE — 76820 UMBILICAL ARTERY ECHO: CPT | Performed by: OBSTETRICS & GYNECOLOGY

## 2024-03-12 PROCEDURE — 76819 FETAL BIOPHYS PROFIL W/O NST: CPT

## 2024-03-12 NOTE — PROGRESS NOTES
Patient denies bleeding, leaking fluid but is having anthony Samuels  NIPT XXY  Patients next follow up with Dr. SALEEM Astorga is 3/15/2024

## 2024-03-14 ENCOUNTER — PATIENT OUTREACH (OUTPATIENT)
Dept: LABOR AND DELIVERY | Facility: HOSPITAL | Age: 24
End: 2024-03-14
Payer: MEDICAID

## 2024-03-14 ENCOUNTER — APPOINTMENT (OUTPATIENT)
Dept: ULTRASOUND IMAGING | Facility: HOSPITAL | Age: 24
End: 2024-03-14
Payer: MEDICAID

## 2024-03-14 ENCOUNTER — HOSPITAL ENCOUNTER (OUTPATIENT)
Facility: HOSPITAL | Age: 24
Discharge: HOME | End: 2024-03-14
Attending: OBSTETRICS & GYNECOLOGY | Admitting: OBSTETRICS & GYNECOLOGY
Payer: MEDICAID

## 2024-03-14 VITALS
DIASTOLIC BLOOD PRESSURE: 59 MMHG | HEART RATE: 82 BPM | OXYGEN SATURATION: 99 % | HEIGHT: 62 IN | TEMPERATURE: 98.3 F | WEIGHT: 122.5 LBS | RESPIRATION RATE: 16 BRPM | SYSTOLIC BLOOD PRESSURE: 100 MMHG | BODY MASS INDEX: 22.54 KG/M2

## 2024-03-14 LAB
ABO GROUP BLD: NORMAL
AMPHET+METHAMPHET UR QL: NEGATIVE
AMPHETAMINES UR QL: NEGATIVE
BARBITURATES UR QL SCN: NEGATIVE
BENZODIAZ UR QL SCN: NEGATIVE
BILIRUB UR QL STRIP: NEGATIVE
BLD GP AB SCN SERPL QL: NEGATIVE
BUPRENORPHINE SERPL-MCNC: NEGATIVE NG/ML
CANNABINOIDS SERPL QL: NEGATIVE
CLARITY UR: CLEAR
COCAINE UR QL: NEGATIVE
COLOR UR: YELLOW
DEPRECATED RDW RBC AUTO: 42 FL (ref 37–54)
ERYTHROCYTE [DISTWIDTH] IN BLOOD BY AUTOMATED COUNT: 12.6 % (ref 12.3–15.4)
FENTANYL UR-MCNC: NEGATIVE NG/ML
GLUCOSE BLDC GLUCOMTR-MCNC: 95 MG/DL (ref 70–130)
GLUCOSE UR STRIP-MCNC: ABNORMAL MG/DL
HCT VFR BLD AUTO: 32.9 % (ref 34–46.6)
HGB BLD-MCNC: 11.2 G/DL (ref 12–15.9)
HGB UR QL STRIP.AUTO: NEGATIVE
KETONES UR QL STRIP: NEGATIVE
LEUKOCYTE ESTERASE UR QL STRIP.AUTO: NEGATIVE
MCH RBC QN AUTO: 31.1 PG (ref 26.6–33)
MCHC RBC AUTO-ENTMCNC: 34 G/DL (ref 31.5–35.7)
MCV RBC AUTO: 91.4 FL (ref 79–97)
METHADONE UR QL SCN: NEGATIVE
NITRITE UR QL STRIP: NEGATIVE
OPIATES UR QL: NEGATIVE
OXYCODONE UR QL SCN: NEGATIVE
PCP UR QL SCN: NEGATIVE
PH UR STRIP.AUTO: 6 [PH] (ref 5–8)
PLATELET # BLD AUTO: 182 10*3/MM3 (ref 140–450)
PMV BLD AUTO: 9.5 FL (ref 6–12)
PROT UR QL STRIP: NEGATIVE
RBC # BLD AUTO: 3.6 10*6/MM3 (ref 3.77–5.28)
RH BLD: POSITIVE
SP GR UR STRIP: 1.01 (ref 1–1.03)
T&S EXPIRATION DATE: NORMAL
TRICYCLICS UR QL SCN: NEGATIVE
UROBILINOGEN UR QL STRIP: ABNORMAL
WBC NRBC COR # BLD AUTO: 10.19 10*3/MM3 (ref 3.4–10.8)

## 2024-03-14 PROCEDURE — 85027 COMPLETE CBC AUTOMATED: CPT | Performed by: OBSTETRICS & GYNECOLOGY

## 2024-03-14 PROCEDURE — 82948 REAGENT STRIP/BLOOD GLUCOSE: CPT

## 2024-03-14 PROCEDURE — 86850 RBC ANTIBODY SCREEN: CPT | Performed by: OBSTETRICS & GYNECOLOGY

## 2024-03-14 PROCEDURE — G0463 HOSPITAL OUTPT CLINIC VISIT: HCPCS

## 2024-03-14 PROCEDURE — 59025 FETAL NON-STRESS TEST: CPT

## 2024-03-14 PROCEDURE — 25010000002 TERBUTALINE PER 1 MG: Performed by: OBSTETRICS & GYNECOLOGY

## 2024-03-14 PROCEDURE — 76819 FETAL BIOPHYS PROFIL W/O NST: CPT | Performed by: RADIOLOGY

## 2024-03-14 PROCEDURE — 76819 FETAL BIOPHYS PROFIL W/O NST: CPT

## 2024-03-14 PROCEDURE — 96372 THER/PROPH/DIAG INJ SC/IM: CPT

## 2024-03-14 PROCEDURE — 36415 COLL VENOUS BLD VENIPUNCTURE: CPT | Performed by: OBSTETRICS & GYNECOLOGY

## 2024-03-14 PROCEDURE — 25810000003 LACTATED RINGERS SOLUTION: Performed by: OBSTETRICS & GYNECOLOGY

## 2024-03-14 PROCEDURE — 81003 URINALYSIS AUTO W/O SCOPE: CPT | Performed by: OBSTETRICS & GYNECOLOGY

## 2024-03-14 PROCEDURE — 87086 URINE CULTURE/COLONY COUNT: CPT | Performed by: OBSTETRICS & GYNECOLOGY

## 2024-03-14 PROCEDURE — 86901 BLOOD TYPING SEROLOGIC RH(D): CPT | Performed by: OBSTETRICS & GYNECOLOGY

## 2024-03-14 PROCEDURE — 86900 BLOOD TYPING SEROLOGIC ABO: CPT | Performed by: OBSTETRICS & GYNECOLOGY

## 2024-03-14 PROCEDURE — 80307 DRUG TEST PRSMV CHEM ANLYZR: CPT | Performed by: OBSTETRICS & GYNECOLOGY

## 2024-03-14 RX ORDER — SODIUM CHLORIDE, SODIUM LACTATE, POTASSIUM CHLORIDE, CALCIUM CHLORIDE 600; 310; 30; 20 MG/100ML; MG/100ML; MG/100ML; MG/100ML
125 INJECTION, SOLUTION INTRAVENOUS CONTINUOUS
Status: DISCONTINUED | OUTPATIENT
Start: 2024-03-14 | End: 2024-03-14 | Stop reason: HOSPADM

## 2024-03-14 RX ORDER — PRENATAL VIT/IRON FUM/FOLIC AC 27MG-0.8MG
1 TABLET ORAL DAILY
Status: CANCELLED | OUTPATIENT
Start: 2024-03-14

## 2024-03-14 RX ORDER — NIFEDIPINE 30 MG/1
30 TABLET, EXTENDED RELEASE ORAL DAILY
Status: CANCELLED | OUTPATIENT
Start: 2024-03-14

## 2024-03-14 RX ORDER — SODIUM CHLORIDE 0.9 % (FLUSH) 0.9 %
10 SYRINGE (ML) INJECTION EVERY 12 HOURS SCHEDULED
Status: DISCONTINUED | OUTPATIENT
Start: 2024-03-14 | End: 2024-03-14 | Stop reason: HOSPADM

## 2024-03-14 RX ORDER — SODIUM CHLORIDE 0.9 % (FLUSH) 0.9 %
10 SYRINGE (ML) INJECTION AS NEEDED
Status: DISCONTINUED | OUTPATIENT
Start: 2024-03-14 | End: 2024-03-14 | Stop reason: HOSPADM

## 2024-03-14 RX ORDER — TERBUTALINE SULFATE 1 MG/ML
0.25 INJECTION, SOLUTION SUBCUTANEOUS ONCE
Status: COMPLETED | OUTPATIENT
Start: 2024-03-14 | End: 2024-03-14

## 2024-03-14 RX ADMIN — SODIUM CHLORIDE, POTASSIUM CHLORIDE, SODIUM LACTATE AND CALCIUM CHLORIDE 1000 ML: 600; 310; 30; 20 INJECTION, SOLUTION INTRAVENOUS at 02:04

## 2024-03-14 RX ADMIN — TERBUTALINE SULFATE 0.25 MG: 1 INJECTION SUBCUTANEOUS at 01:32

## 2024-03-14 NOTE — DISCHARGE INSTRUCTIONS
Keep your regular scheduled follow up appt. Return to L&D for any signs or symptoms of labor, contractions, leaking of fluid, vaginal bleeding, or if you are not feeling your baby move as usual.

## 2024-03-14 NOTE — PLAN OF CARE
Goal Outcome Evaluation:  Plan of Care Reviewed With: patient        Progress: improving  Outcome Evaluation: PLAN OF CARE CONTINUED, IVF INFUSING AS ORDERED, BPP SCHEDULED FOR THIS AM.

## 2024-03-14 NOTE — H&P
TORRES Brock  Obstetric History and Physical    Chief Complaint   Patient presents with    Contractions     PT. C/O IRREGULAR UC ALL DAY 3/13/24, WORSENING OVER THE LAST COUPLE OF HOURS. REPORTS +FM AND DENIES VB AND LOF.       Subjective     Patient is a 23 y.o. female  currently at 33w5d, who presents with  contractions.    Her prenatal care is benign.  Her previous obstetric/gynecological history is noted for is non-contributory.    The following portions of the patient's history were reviewed and updated as appropriate: current medications, allergies, past medical history, past surgical history, past family history, past social history, and problem list .   Social History     Socioeconomic History    Marital status:    Tobacco Use    Smoking status: Never     Passive exposure: Never    Smokeless tobacco: Never   Vaping Use    Vaping status: Never Used   Substance and Sexual Activity    Alcohol use: No    Drug use: No    Sexual activity: Yes     Partners: Male     Birth control/protection: None     Past Medical History:   Diagnosis Date    Ectopic pregnancy     Ectopic pregnancy 2021       Current Facility-Administered Medications:     lactated ringers infusion, 125 mL/hr, Intravenous, Continuous, Colette Rivas E, DO, Stopped at 24 0814    sodium chloride 0.9 % flush 10 mL, 10 mL, Intravenous, Q12H, EvelynJeanineColette E, DO    sodium chloride 0.9 % flush 10 mL, 10 mL, Intravenous, PRN, Colette Rivas, DO  No Known Allergies  Past Surgical History:   Procedure Laterality Date    ECTOPIC PREGNANCY  2021    WISDOM TOOTH EXTRACTION  10/16/2017         Prenatal Information:   Maternal Prenatal Labs  Blood Type ABO Type   Date Value Ref Range Status   2024 O  Final      Rh Status RH type   Date Value Ref Range Status   2024 Positive  Final      Antibody Screen Antibody Screen   Date Value Ref Range Status   2024 Negative  Final      Rapid Urine Drug Screen  "Barbiturates Screen, Urine   Date Value Ref Range Status   03/14/2024 Negative Negative Final     Benzodiazepine Screen, Urine   Date Value Ref Range Status   03/14/2024 Negative Negative Final     Methadone Screen, Urine   Date Value Ref Range Status   03/14/2024 Negative Negative Final     Opiate Screen   Date Value Ref Range Status   03/14/2024 Negative Negative Final     THC, Screen, Urine   Date Value Ref Range Status   03/14/2024 Negative Negative Final     Cocaine Screen, Urine   Date Value Ref Range Status   03/14/2024 Negative Negative Final     Amphetamine Screen, Urine   Date Value Ref Range Status   03/14/2024 Negative Negative Final     Buprenorphine, Screen, Urine   Date Value Ref Range Status   03/14/2024 Negative Negative Final     Methamphetamine, Ur   Date Value Ref Range Status   03/14/2024 Negative Negative Final     Oxycodone Screen, Urine   Date Value Ref Range Status   03/14/2024 Negative Negative Final     Tricyclic Antidepressants Screen   Date Value Ref Range Status   03/14/2024 Negative Negative Final      Group B Strep Culture No results found for: \"GBSANTIGEN\"           External Prenatal Results       Pregnancy Outside Results - Transcribed From Office Records - See Scanned Records For Details       Test Value Date Time    ABO  O  03/14/24 0211    Rh  Positive  03/14/24 0211    Antibody Screen  Negative  03/14/24 0211       Negative  03/03/24 2312       Negative  09/04/23 1855    Varicella IgG       Rubella  1.20 index 03/03/24 2312    Hgb  11.2 g/dL 03/14/24 0136       11.0 g/dL 03/03/24 2312       11.5 g/dL 03/02/24 2344       14.1 g/dL 09/04/23 1855    Hct  32.9 % 03/14/24 0136       32.6 % 03/03/24 2312       34.6 % 03/02/24 2344       42.3 % 09/04/23 1855    Glucose Fasting GTT       Glucose Tolerance Test 1 hour ^ 81  02/04/19     Glucose Tolerance Test 3 hour       Gonorrhea (discrete) ^ Negative  09/13/18     Chlamydia (discrete) ^ Negative  09/13/18     RPR  Non-Reactive  " 24    VDRL       Syphilis Antibody       HBsAg  Non-Reactive  24    Herpes Simplex Virus PCR       Herpes Simplex VIrus Culture       HIV  Non-Reactive  24    Hep C RNA Quant PCR       Hep C Antibody  Non-Reactive  24    AFP       Group B Strep  No Group B Streptococcus isolated  19 1059    GBS Susceptibility to Clindamycin       GBS Susceptibility to Erythromycin       Fetal Fibronectin       Genetic Testing, Maternal Blood                 Drug Screening       Test Value Date Time    Urine Drug Screen       Amphetamine Screen  Negative  24 0035    Barbiturate Screen  Negative  24 0035    Benzodiazepine Screen  Negative  24 0035    Methadone Screen  Negative  24 0035    Phencyclidine Screen  Negative  24 0035    Opiates Screen  Negative  24 0035    THC Screen  Negative  24 0035    Cocaine Screen       Propoxyphene Screen  Negative  14 1129    Buprenorphine Screen  Negative  24 0035    Methamphetamine Screen       Oxycodone Screen  Negative  24 0035    Tricyclic Antidepressants Screen  Negative  24 0035              Legend    ^: Historical                              Past OB History:     OB History    Para Term  AB Living   4 1 1 0 2 1   SAB IAB Ectopic Molar Multiple Live Births   1 0 1 0 0 1      # Outcome Date GA Lbr Wisam/2nd Weight Sex Type Anes PTL Lv   4 Current            3 Ectopic 2021 6w0d    ECTOPIC      2 Term 19 39w0d  3640 g (8 lb 0.4 oz) M Vag-Spont EPI N LIVIA      Birth Comments: 97.8, 170, 60      Name: IRINA KENNYMIGUEL      Apgar1: 8  Apgar5: 9   1 SAB 18 7w0d    SAB         Obstetric Comments   FOB #1 Pregnancy #1 SAB at 7 weeks   FOB #1 Pregnancy #2  at 39 weeks, male   FOB #2 Pregnancy #3 Ectopic at 6 weeks   FOB #2 Pregnancy #4 current       Past Medical History: Past Medical History:   Diagnosis Date    Ectopic pregnancy     Ectopic pregnancy  12/14/2021      Past Surgical History Past Surgical History:   Procedure Laterality Date    ECTOPIC PREGNANCY  12/01/2021    WISDOM TOOTH EXTRACTION  10/16/2017      Family History: Family History   Problem Relation Age of Onset    Hypertension Father     Thyroid disease Father     Hypertension Mother     Diabetes Mother     Thyroid disease Mother     Throat cancer Mother     Stomach cancer Mother     Ovarian cancer Mother     Rheum arthritis Mother     Seizures Mother     Asthma Mother     Rheum arthritis Paternal Grandfather     Skin cancer Maternal Grandfather     Atrial fibrillation Maternal Grandfather     Rheum arthritis Maternal Grandfather     Stroke Maternal Grandfather     Asthma Brother       Social History:  reports that she has never smoked. She has never been exposed to tobacco smoke. She has never used smokeless tobacco.   reports no history of alcohol use.   reports no history of drug use.        Review of Systems-all negative except as noted in HPI      Objective     Vital Signs Range for the last 24 hours  Temperature: Temp:  [98.3 °F (36.8 °C)] 98.3 °F (36.8 °C)   Temp Source: Temp src: Oral   BP: BP: (100-122)/(59-69) 100/59   Pulse: Heart Rate:  [] 82   Respirations: Resp:  [16] 16   Weight: Weight:  [55.6 kg (122 lb 8 oz)] 55.6 kg (122 lb 8 oz)     Physical Examination: General appearance - alert, well appearing, and in no distress, oriented to person, place, and time, normal appearing weight and well hydrated  Mental status - alert, oriented to person, place, and time, normal mood, behavior, speech, dress, motor activity, and thought processes, affect appropriate to mood  Neck - supple, no significant adenopathy  Chest - clear to auscultation, no wheezes, rales or rhonchi, symmetric air entry, no tachypnea, retractions or cyanosis  Heart - normal rate, regular rhythm, normal S1, S2, no murmurs, rubs, clicks or gallops  Abdomen - soft, nontender, gravid uterus, no masses or  organomegaly  no rebound tenderness noted,   Pelvic - normal external genitalia, vulva, vagina, cervix, uterus and adnexa  Neurological - alert, oriented, normal speech, no focal findings or movement disorder noted  Musculoskeletal - no joint tenderness, deformity or swelling  Extremities - peripheral pulses normal, no pedal edema, no clubbing or cyanosis  Skin - normal coloration and turgor, no rashes, no suspicious skin lesions noted        Cervix: Exam by:     Dilation:     Effacement:     Station:       Laboratory Results:   Lab Results (last 24 hours)       Procedure Component Value Units Date/Time    POC Glucose Once [[id number]]  (Normal) Collected: 03/14/24 0337    Specimen: Blood Updated: 03/14/24 0343     Glucose 95 mg/dL     Fentanyl, Urine - Urine, Clean Catch [[id number]]  (Normal) Collected: 03/14/24 0035    Specimen: Urine, Clean Catch Updated: 03/14/24 0251     Fentanyl, Urine Negative    Narrative:      Negative Threshold:      Fentanyl 5 ng/mL     The normal value for the drug tested is negative. This report includes final unconfirmed screening results to be used for medical treatment purposes only. Unconfirmed results must not be used for non-medical purposes such as employment or legal testing. Clinical consideration should be applied to any drug of abuse test, particularly when unconfirmed results are used.           Urine Drug Screen - Urine, Clean Catch [[id number]]  (Normal) Collected: 03/14/24 0035    Specimen: Urine, Clean Catch Updated: 03/14/24 0249     THC, Screen, Urine Negative     Phencyclidine (PCP), Urine Negative     Cocaine Screen, Urine Negative     Methamphetamine, Ur Negative     Opiate Screen Negative     Amphetamine Screen, Urine Negative     Benzodiazepine Screen, Urine Negative     Tricyclic Antidepressants Screen Negative     Methadone Screen, Urine Negative     Barbiturates Screen, Urine Negative     Oxycodone Screen, Urine Negative     Buprenorphine, Screen, Urine Negative     Narrative:      Cutoff For Drugs Screened:    Amphetamines               500 ng/ml  Barbiturates               200 ng/ml  Benzodiazepines            150 ng/ml  Cocaine                    150 ng/ml  Methadone                  200 ng/ml  Opiates                    100 ng/ml  Phencyclidine               25 ng/ml  THC                         50 ng/ml  Methamphetamine            500 ng/ml  Tricyclic Antidepressants  300 ng/ml  Oxycodone                  100 ng/ml  Buprenorphine               10 ng/ml    The normal value for all drugs tested is negative. This report includes unconfirmed screening results, with the cutoff values listed, to be used for medical treatment purposes only.  Unconfirmed results must not be used for non-medical purposes such as employment or legal testing.  Clinical consideration should be applied to any drug of abuse test, particularly when unconfirmed results are used.      CBC (No Diff) [[id number]]  (Abnormal) Collected: 03/14/24 0136    Specimen: Blood Updated: 03/14/24 0143     WBC 10.19 10*3/mm3      RBC 3.60 10*6/mm3      Hemoglobin 11.2 g/dL      Hematocrit 32.9 %      MCV 91.4 fL      MCH 31.1 pg      MCHC 34.0 g/dL      RDW 12.6 %      RDW-SD 42.0 fl      MPV 9.5 fL      Platelets 182 10*3/mm3     Urinalysis With Culture If Indicated - Urine, Clean Catch [[id number]]  (Abnormal) Collected: 03/14/24 0035    Specimen: Urine, Clean Catch Updated: 03/14/24 0052     Color, UA Yellow     Appearance, UA Clear     pH, UA 6.0     Specific Gravity, UA 1.013     Glucose,  mg/dL (1+)     Ketones, UA Negative     Bilirubin, UA Negative     Blood, UA Negative     Protein, UA Negative     Leuk Esterase, UA Negative     Nitrite, UA Negative     Urobilinogen, UA 1.0 E.U./dL    Narrative:      In absence of clinical symptoms, the presence of pyuria, bacteria, and/or nitrites on the urinalysis result does not correlate with infection.  Urine microscopic not indicated.    Urine Culture - Urine,  "Urine, Clean Catch [[id number]] Collected: 24 0035    Specimen: Urine, Clean Catch Updated: 24 005          Radiology Review:   Imaging Results (Last 72 Hours)       Procedure Component Value Units Date/Time    US Fetal Biophysical Profile;Without Non-Stress Testing [[id number]] Collected: 24     Updated: 24    Narrative:      EXAMINATION: US FETAL BIOPHYSICAL PROFILE;WITHOUT NON-STRESS TESTING-      CLINICAL INDICATION: IUGR        COMPARISON: None available     Sonographic imaging of the pregnant pelvis     Single living intrauterine pregnancy biophysical profile score 8 out of  8.     Amniotic fluid index 11.94       Impression:      Biophysical profile score 8 out of 8        This report was finalized on 3/14/2024 8:08 AM by Dr. Rik Robles MD.                 Assessment & Plan       Pregnancy      Assessment & Plan    Assessment:  1.  Intrauterine pregnancy at 33w5d weeks gestation with reassuring fetal status.    2.   contractions  3.  Obstetrical history significant for is noncontributory.  4.  GBS status: No results found for: \"GBSANTIGEN\"    Plan:  1. Will observe for PTL and get BPP due to possible fetal heart rate deceleration overnight  2. Plan of care has been reviewed with patient   3.  Risks, benefits of treatment plan have been discussed.  4.  All questions have been answered.        Mana Astorga, DO  3/14/2024  08:49 EDT    "

## 2024-03-14 NOTE — DISCHARGE SUMMARY
Discharge Summary    Admit Date: 3/14/2024 12:02 AM    Admit Diagnosis: Pregnancy [Z34.90]    Date of Discharge:  3/14/2024    Discharge Diagnosis: Same        Hospital Course  Patient is a 23 y.o. female, . Patient was admitted due to  contractions. Patient doing better. Symptoms have improved. Patient tolerating a regular diet and ambulating without difficulty. Will discharge to home today.         Vital Signs  Temp:  [98.3 °F (36.8 °C)] 98.3 °F (36.8 °C)  Heart Rate:  [] 82  Resp:  [16] 16  BP: (100-122)/(59-69) 100/59    Review of Systems    The following systems were reviewed and negative;  ENT, respiratory, cardiovascular, gastrointestinal, genitourinary, breast, endocrine and allergies / immunologic.      Physical Exam:      General Appearance:    Alert, cooperative, in no acute distress   Head:    Normocephalic, without obvious abnormality, atraumatic   Eyes:            Lids and lashes normal, conjunctivae and sclerae normal, no   icterus, no pallor, corneas clear, PERRLA   Ears:    Ears appear intact with no abnormalities noted   Throat:   No oral lesions, no thrush, oral mucosa moist   Neck:   No adenopathy, supple, trachea midline, no thyromegaly, no     carotid bruit, no JVD   Back:     No kyphosis present, no scoliosis present, no skin lesions,       erythema or scars, no tenderness to percussion or                   palpation,   range of motion normal   Lungs:     Clear to auscultation,respirations regular, even and                   unlabored    Heart:    Regular rhythm and normal rate, normal S1 and S2, no            murmur, no gallop, no rub, no click   Breast Exam:    Deferred   Abdomen:     Normal bowel sounds, no masses, no organomegaly, soft        non-tender, gravid uterus, no guarding, no rebound                 tenderness   Genitalia:    Deferred   Extremities:   Moves all extremities well, no edema, no cyanosis, no              redness   Pulses:   Pulses palpable and  equal bilaterally   Skin:   No bleeding, bruising or rash   Lymph nodes:   No palpable adenopathy   Neurologic:   Cranial nerves 2 - 12 grossly intact, sensation intact, DTR        present and equal bilaterally             Condition on Discharge:  Stable    Urine Output Good    Discharge Diet: Regular    Follow-up Appointments  Patient will follow up in clinic this week.        Mana Astorga DO   03/14/24  08:51 EDT

## 2024-03-14 NOTE — NON STRESS TEST
Alicia Nails, a  at 33w5d with an DORA of 2024, Date entered prior to episode creation, was seen at [facility] for a nonstress test.    Chief Complaint   Patient presents with    Contractions     PT. C/O IRREGULAR UC ALL DAY 3/13/24, WORSENING OVER THE LAST COUPLE OF HOURS. REPORTS +FM AND DENIES VB AND LOF.       Patient Active Problem List   Diagnosis    Premature cervical dilation    Abdominal pain during pregnancy in third trimester     labor    Pregnancy    Abnormal genetic test during pregnancy    Single umbilical artery    IUGR (intrauterine growth retardation) affecting mother, third trimester, not applicable or unspecified fetus       Start Time: 0108  Stop Time: 012    Interpretation A  Nonstress Test Interpretation A: Reactive  Comments A: VERIFIED BY BRUNO QUINONES RN

## 2024-03-15 LAB — BACTERIA SPEC AEROBE CULT: NO GROWTH

## 2024-03-18 ENCOUNTER — PATIENT OUTREACH (OUTPATIENT)
Dept: LABOR AND DELIVERY | Facility: HOSPITAL | Age: 24
End: 2024-03-18
Payer: MEDICAID

## 2024-03-18 NOTE — OUTREACH NOTE
[facility]  Unable to Reach       Questions/Answers      Flowsheet Row Responses   Pending Outreach Prenatal Check-in   Call Attempt Third   Outcome Left message   Next Call Attempt Date 03/26/24            Left message with call back number.    Caroline Yusuf RN  Maternity Nurse Navigator    3/18/2024, 15:58 EDT

## 2024-03-19 ENCOUNTER — HOSPITAL ENCOUNTER (OUTPATIENT)
Dept: WOMENS IMAGING | Facility: HOSPITAL | Age: 24
Discharge: HOME | End: 2024-03-19
Admitting: OBSTETRICS & GYNECOLOGY
Payer: MEDICAID

## 2024-03-19 ENCOUNTER — OFFICE VISIT (OUTPATIENT)
Dept: OBSTETRICS AND GYNECOLOGY | Facility: HOSPITAL | Age: 24
End: 2024-03-19
Payer: MEDICAID

## 2024-03-19 VITALS — SYSTOLIC BLOOD PRESSURE: 119 MMHG | BODY MASS INDEX: 22.68 KG/M2 | WEIGHT: 124 LBS | DIASTOLIC BLOOD PRESSURE: 69 MMHG

## 2024-03-19 DIAGNOSIS — O28.5 ABNORMAL GENETIC TEST DURING PREGNANCY: ICD-10-CM

## 2024-03-19 DIAGNOSIS — Q27.0 SINGLE UMBILICAL ARTERY: ICD-10-CM

## 2024-03-19 DIAGNOSIS — O36.5930 IUGR (INTRAUTERINE GROWTH RETARDATION) AFFECTING MOTHER, THIRD TRIMESTER, NOT APPLICABLE OR UNSPECIFIED FETUS: ICD-10-CM

## 2024-03-19 DIAGNOSIS — O36.5930 IUGR (INTRAUTERINE GROWTH RETARDATION) AFFECTING MOTHER, THIRD TRIMESTER, NOT APPLICABLE OR UNSPECIFIED FETUS: Primary | ICD-10-CM

## 2024-03-19 DIAGNOSIS — Z3A.33 33 WEEKS GESTATION OF PREGNANCY: ICD-10-CM

## 2024-03-19 PROCEDURE — 76819 FETAL BIOPHYS PROFIL W/O NST: CPT | Performed by: OBSTETRICS & GYNECOLOGY

## 2024-03-19 PROCEDURE — 76819 FETAL BIOPHYS PROFIL W/O NST: CPT

## 2024-03-19 PROCEDURE — 76820 UMBILICAL ARTERY ECHO: CPT | Performed by: OBSTETRICS & GYNECOLOGY

## 2024-03-19 PROCEDURE — 76816 OB US FOLLOW-UP PER FETUS: CPT | Performed by: OBSTETRICS & GYNECOLOGY

## 2024-03-19 PROCEDURE — 99213 OFFICE O/P EST LOW 20 MIN: CPT | Performed by: OBSTETRICS & GYNECOLOGY

## 2024-03-19 PROCEDURE — 76820 UMBILICAL ARTERY ECHO: CPT

## 2024-03-19 PROCEDURE — 76816 OB US FOLLOW-UP PER FETUS: CPT

## 2024-03-19 NOTE — LETTER
"2024       No Recipients    Patient: Alicia Nails   YOB: 2000   Date of Visit: 3/19/2024       Dear HAIDER Markham,    Thank you for referring Alicia Nails to me for evaluation. Below is a copy of my consult note.    If you have questions, please do not hesitate to call me. I look forward to following Alicia along with you.         Sincerely,        Sauol Dickson MD        CC:   No Recipients    Patient denies bleeding, leaking fluid or contractions  NIPT XXY  Patients next follow up with Dr. SALEEM Astorga is 3/22/24        Maternal/Fetal Medicine Consult Note   Date: 2024  Name: Alicia Nails    : 2000     MRN: [id number]     Referring Provider: HAIDER Markham    Chief Complaint  NIPT-Klinfelter    Subjective     History of Present Illness:  Alicia Nails is a 23 y.o.  34w3d who presents today for abnormal NIPT, IUGR, single umbilical artery    DORA: Estimated Date of Delivery: 24     ROS:   Otherwise Noted in HPI      Current Outpatient Medications:   •  NIFEdipine XL (Procardia XL) 30 MG 24 hr tablet, Take 1 tablet by mouth Daily., Disp: 30 tablet, Rfl: 0  •  Prenatal Vit-Fe Fumarate-FA (prenatal vitamin 27-0.8) 27-0.8 MG tablet tablet, Take 1 tablet by mouth Daily., Disp: , Rfl:     Objective     Vital Signs  /69   Wt 56.2 kg (124 lb)   Estimated body mass index is 22.68 kg/m² as calculated from the following:    Height as of 3/14/24: 157.5 cm (62\").    Weight as of this encounter: 56.2 kg (124 lb).    Ultrasound Impression:   See Viewpoint     Assessment and Plan     Alicia Nails is a 23 y.o.  34w3d who presents today for NIPT, IUGR, single umbilical artery    Diagnoses and all orders for this visit:    1. IUGR (intrauterine growth retardation) affecting mother, third trimester, not applicable or unspecified fetus (Primary)  Assessment & Plan:  IUGR based on abdominal circumference less than the 1st percentile 2 " weeks ago.  Follow-up ultrasound today shows overall efw normal, though AC at the 4%ile. ALLISON, BPP, UA dopplers normal today. Continue weekly follow up. Agree with 37 week delivery at this time.     Orders:  -     [facility]; Standing    2. Single umbilical artery  -     [facility]; Standing         Follow Up  Follow-up in 1 week    I spent 10 minutes caring for the patient on the day of service. This included: obtaining or reviewing a separately obtained medical history, reviewing patient records, performing a medically appropriate exam and/or evaluation, counseling or educating the patient/family/caregiver, ordering medications, labs, and/or procedures and documenting such in the medical record. This does not include time spent on review and interpretation of other tests such as fetal ultrasound or the performance of other procedures such as amniocentesis or CVS.      Saulo Dickson MD, FACOG  Maternal Fetal Medicine, [facility] [place]

## 2024-03-19 NOTE — PROGRESS NOTES
"    Maternal/Fetal Medicine Consult Note   Date: 2024  Name: Alicia Nails    : 2000     MRN: [id number]     Referring Provider: HAIDER Markham    Chief Complaint  NIPT-[place]infelter    Subjective     History of Present Illness:  Alicia Nails is a 23 y.o.  34w3d who presents today for abnormal NIPT, IUGR, single umbilical artery    DORA: Estimated Date of Delivery: 24     ROS:   Otherwise Noted in HPI      Current Outpatient Medications:     NIFEdipine XL (Procardia XL) 30 MG 24 hr tablet, Take 1 tablet by mouth Daily., Disp: 30 tablet, Rfl: 0    Prenatal Vit-Fe Fumarate-FA (prenatal vitamin 27-0.8) 27-0.8 MG tablet tablet, Take 1 tablet by mouth Daily., Disp: , Rfl:     Objective     Vital Signs  /69   Wt 56.2 kg (124 lb)   Estimated body mass index is 22.68 kg/m² as calculated from the following:    Height as of 3/14/24: 157.5 cm (62\").    Weight as of this encounter: 56.2 kg (124 lb).    Ultrasound Impression:   See Viewpoint     Assessment and Plan     Alicia Nails is a 23 y.o.  34w3d who presents today for NIPT, IUGR, single umbilical artery    Diagnoses and all orders for this visit:    1. IUGR (intrauterine growth retardation) affecting mother, third trimester, not applicable or unspecified fetus (Primary)  Assessment & Plan:  IUGR based on abdominal circumference less than the 1st percentile 2 weeks ago.  Follow-up ultrasound today shows overall efw normal, though AC at the 4%ile. ALLISON, BPP, UA dopplers normal today. Continue weekly follow up. Agree with 37 week delivery at this time.     Orders:  -     [facility]; Standing    2. Single umbilical artery  -     [facility]; Standing         Follow Up  Follow-up in 1 week    I spent 10 minutes caring for the patient on the day of service. This included: obtaining or reviewing a separately obtained medical history, reviewing patient records, performing a " medically appropriate exam and/or evaluation, counseling or educating the patient/family/caregiver, ordering medications, labs, and/or procedures and documenting such in the medical record. This does not include time spent on review and interpretation of other tests such as fetal ultrasound or the performance of other procedures such as amniocentesis or CVS.      Saulo Dickson MD, FACOG  Maternal Fetal Medicine, [facility] [place]

## 2024-03-19 NOTE — ASSESSMENT & PLAN NOTE
IUGR based on abdominal circumference less than the 1st percentile 2 weeks ago.  Follow-up ultrasound today shows overall efw normal, though AC at the 4%ile. ALLISON, BPP, UA dopplers normal today. Continue weekly follow up. Agree with 37 week delivery at this time.

## 2024-03-19 NOTE — PROGRESS NOTES
Patient denies bleeding, leaking fluid or contractions  NIPT XXY  Patients next follow up with Dr. SALEEM Astorga is 3/22/24

## 2024-03-26 ENCOUNTER — PATIENT OUTREACH (OUTPATIENT)
Dept: LABOR AND DELIVERY | Facility: HOSPITAL | Age: 24
End: 2024-03-26
Payer: MEDICAID

## 2024-03-26 ENCOUNTER — HOSPITAL ENCOUNTER (OUTPATIENT)
Dept: WOMENS IMAGING | Facility: HOSPITAL | Age: 24
Discharge: HOME | End: 2024-03-26
Admitting: NURSE PRACTITIONER
Payer: MEDICAID

## 2024-03-26 ENCOUNTER — OFFICE VISIT (OUTPATIENT)
Dept: OBSTETRICS AND GYNECOLOGY | Facility: HOSPITAL | Age: 24
End: 2024-03-26
Payer: MEDICAID

## 2024-03-26 VITALS — BODY MASS INDEX: 23.12 KG/M2 | DIASTOLIC BLOOD PRESSURE: 58 MMHG | WEIGHT: 126.4 LBS | SYSTOLIC BLOOD PRESSURE: 109 MMHG

## 2024-03-26 DIAGNOSIS — O36.5930 IUGR (INTRAUTERINE GROWTH RETARDATION) AFFECTING MOTHER, THIRD TRIMESTER, NOT APPLICABLE OR UNSPECIFIED FETUS: Primary | ICD-10-CM

## 2024-03-26 PROCEDURE — 76819 FETAL BIOPHYS PROFIL W/O NST: CPT

## 2024-03-26 PROCEDURE — 76820 UMBILICAL ARTERY ECHO: CPT

## 2024-03-26 NOTE — LETTER
"2024       No Recipients    Patient: Alicia Nails   YOB: 2000   Date of Visit: 3/26/2024       Dear HAIDER Markham,    Thank you for referring Alicia Nails to me for evaluation. Below is a copy of my consult note.    If you have questions, please do not hesitate to call me. I look forward to following Alicia along with you.         Sincerely,        Mounika Lambert MD        CC:   No Recipients    No complaints today, Next f/u appointment with Rizwan on 3/29, NIPT XXY        Maternal/Fetal Medicine Follow Up Note     Name: Alicia Nails    : 2000     MRN: [id number]     Referring Provider: HAIDER Markham    Chief Complaint  NIPT Klinefelter syndrome, IUGR    Subjective     History of Present Illness:  Alicia Nails is a 23 y.o.  35w4d who presents today for follow up in the setting of suspected fetal XXY and known IUGR   Overall well   No interval issues   +FM     DORA: Estimated Date of Delivery: 24     ROS:   As noted in HPI.     Objective     Vital Signs  /58   Wt 57.3 kg (126 lb 6.4 oz)   Estimated body mass index is 23.12 kg/m² as calculated from the following:    Height as of 3/14/24: 157.5 cm (62\").    Weight as of this encounter: 57.3 kg (126 lb 6.4 oz).    Ultrasound Impression:   See viewpoint      Assessment and Plan     Alicia Nails is a 23 y.o.  35w4d     Diagnoses and all orders for this visit:    1. IUGR (intrauterine growth retardation) affecting mother, third trimester, not applicable or unspecified fetus (Primary)  Assessment & Plan:  BPP/ALLISON/UA doppler reassuring today   Plan follow up 1 week with MFM for growth   Continue  testing in your office              Follow Up  1 week     I spent 20 minutes caring for the patient on the day of service. This included: obtaining or reviewing a separately obtained medical history, reviewing patient records, performing a medically appropriate exam " and/or evaluation, counseling or educating the patient/family/caregiver, ordering medications, labs, and/or procedures and documenting such in the medical record. This does not include time spent on review and interpretation of other tests such as fetal ultrasound or the performance of other procedures such as amniocentesis or CVS.    Mounika Lambert MD FACOG  Maternal Fetal Medicine, [facility]     2024

## 2024-03-26 NOTE — OUTREACH NOTE
[facility]  Unable to Reach       Questions/Answers      Flowsheet Row Responses   Pending Outreach Prenatal Check-in   Call Attempt --  [Fourth]   Outcome Left message            Left message with call back number.    Caroline Yusuf RN  Maternity Nurse Navigator    3/26/2024, 14:54 EDT

## 2024-03-27 NOTE — PROGRESS NOTES
"    Maternal/Fetal Medicine Follow Up Note     Name: Alicia Nails    : 2000     MRN: [id number]     Referring Provider: HAIDER Markham    Chief Complaint  NIPT Klinefelter syndrome, IUGR    Subjective     History of Present Illness:  Alicia Nails is a 23 y.o.  35w4d who presents today for follow up in the setting of suspected fetal XXY and known IUGR   Overall well   No interval issues   +FM     DORA: Estimated Date of Delivery: 24     ROS:   As noted in HPI.     Objective     Vital Signs  /58   Wt 57.3 kg (126 lb 6.4 oz)   Estimated body mass index is 23.12 kg/m² as calculated from the following:    Height as of 3/14/24: 157.5 cm (62\").    Weight as of this encounter: 57.3 kg (126 lb 6.4 oz).    Ultrasound Impression:   See viewpoint      Assessment and Plan     Alicia Nails is a 23 y.o.  35w4d     Diagnoses and all orders for this visit:    1. IUGR (intrauterine growth retardation) affecting mother, third trimester, not applicable or unspecified fetus (Primary)  Assessment & Plan:  BPP/ALLISON/UA doppler reassuring today   Plan follow up 1 week with MFM for growth   Continue  testing in your office              Follow Up  1 week     I spent 20 minutes caring for the patient on the day of service. This included: obtaining or reviewing a separately obtained medical history, reviewing patient records, performing a medically appropriate exam and/or evaluation, counseling or educating the patient/family/caregiver, ordering medications, labs, and/or procedures and documenting such in the medical record. This does not include time spent on review and interpretation of other tests such as fetal ultrasound or the performance of other procedures such as amniocentesis or CVS.    Mounika Lambert MD FACOG  Maternal Fetal Medicine, [facility]     2024  "

## 2024-03-27 NOTE — ASSESSMENT & PLAN NOTE
BPP/ALLISON/UA doppler reassuring today   Plan follow up 1 week with MFM for growth   Continue  testing in your office

## 2024-03-29 LAB — EXTERNAL GROUP B STREP ANTIGEN: NEGATIVE

## 2024-04-02 ENCOUNTER — HOSPITAL ENCOUNTER (OUTPATIENT)
Dept: WOMENS IMAGING | Facility: HOSPITAL | Age: 24
Discharge: HOME | End: 2024-04-02
Admitting: NURSE PRACTITIONER
Payer: MEDICAID

## 2024-04-02 ENCOUNTER — OFFICE VISIT (OUTPATIENT)
Dept: OBSTETRICS AND GYNECOLOGY | Facility: HOSPITAL | Age: 24
End: 2024-04-02
Payer: MEDICAID

## 2024-04-02 VITALS — WEIGHT: 127 LBS | DIASTOLIC BLOOD PRESSURE: 60 MMHG | BODY MASS INDEX: 23.23 KG/M2 | SYSTOLIC BLOOD PRESSURE: 117 MMHG

## 2024-04-02 DIAGNOSIS — O36.5930 IUGR (INTRAUTERINE GROWTH RETARDATION) AFFECTING MOTHER, THIRD TRIMESTER, NOT APPLICABLE OR UNSPECIFIED FETUS: ICD-10-CM

## 2024-04-02 DIAGNOSIS — O28.5 ABNORMAL GENETIC TEST DURING PREGNANCY: ICD-10-CM

## 2024-04-02 DIAGNOSIS — Q27.0 SINGLE UMBILICAL ARTERY: Primary | ICD-10-CM

## 2024-04-02 PROCEDURE — 76816 OB US FOLLOW-UP PER FETUS: CPT | Performed by: OBSTETRICS & GYNECOLOGY

## 2024-04-02 PROCEDURE — 76816 OB US FOLLOW-UP PER FETUS: CPT

## 2024-04-02 PROCEDURE — 76820 UMBILICAL ARTERY ECHO: CPT | Performed by: OBSTETRICS & GYNECOLOGY

## 2024-04-02 PROCEDURE — 76819 FETAL BIOPHYS PROFIL W/O NST: CPT | Performed by: OBSTETRICS & GYNECOLOGY

## 2024-04-02 PROCEDURE — 76819 FETAL BIOPHYS PROFIL W/O NST: CPT

## 2024-04-02 PROCEDURE — 76820 UMBILICAL ARTERY ECHO: CPT

## 2024-04-02 NOTE — PROGRESS NOTES
Patient has an appointment with Dr. Mana Astorga on 4/5/24. NIPT xxy. Patient reports sporadic contractions; denies vaginal bleeding and loss of fluid.

## 2024-04-02 NOTE — PROGRESS NOTES
Documentation of the ultrasound findings, images, and interpretations will be available in the patient's Viewpoint report which is located in the imaging tab in chart review.

## 2024-04-10 ENCOUNTER — PATIENT OUTREACH (OUTPATIENT)
Dept: LABOR AND DELIVERY | Facility: HOSPITAL | Age: 24
End: 2024-04-10
Payer: MEDICAID

## 2024-04-10 NOTE — OUTREACH NOTE
[facility]  Unable to Reach       Questions/Answers      Flowsheet Row Responses   Pending Outreach Prenatal Check-in   Call Attempt --  [Fifth]   Outcome Left message   Unable to reach comments: Patient scheduled for IOL on 04/11/2024            Continue to be unable to reach patient by phone. Pt has returned questionnaires by [place].     Caroline Yusuf RN  Maternity Nurse Navigator    4/10/2024, 15:12 EDT

## 2024-04-11 ENCOUNTER — HOSPITAL ENCOUNTER (INPATIENT)
Facility: HOSPITAL | Age: 24
LOS: 2 days | Discharge: HOME | End: 2024-04-13
Attending: OBSTETRICS & GYNECOLOGY | Admitting: OBSTETRICS & GYNECOLOGY
Payer: MEDICAID

## 2024-04-11 ENCOUNTER — HOSPITAL ENCOUNTER (OUTPATIENT)
Dept: LABOR AND DELIVERY | Facility: HOSPITAL | Age: 24
Discharge: HOME | End: 2024-04-11
Payer: MEDICAID

## 2024-04-11 ENCOUNTER — ANESTHESIA (OUTPATIENT)
Dept: LABOR AND DELIVERY | Facility: HOSPITAL | Age: 24
End: 2024-04-11
Payer: MEDICAID

## 2024-04-11 ENCOUNTER — ANESTHESIA EVENT (OUTPATIENT)
Dept: LABOR AND DELIVERY | Facility: HOSPITAL | Age: 24
End: 2024-04-11
Payer: MEDICAID

## 2024-04-11 LAB
ABO GROUP BLD: NORMAL
AMPHET+METHAMPHET UR QL: NEGATIVE
AMPHETAMINES UR QL: NEGATIVE
BARBITURATES UR QL SCN: NEGATIVE
BASOPHILS # BLD AUTO: 0.05 10*3/MM3 (ref 0–0.2)
BASOPHILS NFR BLD AUTO: 0.5 % (ref 0–1.5)
BENZODIAZ UR QL SCN: NEGATIVE
BLD GP AB SCN SERPL QL: NEGATIVE
BUPRENORPHINE SERPL-MCNC: NEGATIVE NG/ML
CANNABINOIDS SERPL QL: NEGATIVE
COCAINE UR QL: NEGATIVE
DEPRECATED RDW RBC AUTO: 41.5 FL (ref 37–54)
EOSINOPHIL # BLD AUTO: 0.02 10*3/MM3 (ref 0–0.4)
EOSINOPHIL NFR BLD AUTO: 0.2 % (ref 0.3–6.2)
ERYTHROCYTE [DISTWIDTH] IN BLOOD BY AUTOMATED COUNT: 12.8 % (ref 12.3–15.4)
FENTANYL UR-MCNC: NEGATIVE NG/ML
GLUCOSE BLDC GLUCOMTR-MCNC: 48 MG/DL (ref 70–130)
HCT VFR BLD AUTO: 32.8 % (ref 34–46.6)
HGB BLD-MCNC: 11 G/DL (ref 12–15.9)
IMM GRANULOCYTES # BLD AUTO: 0.06 10*3/MM3 (ref 0–0.05)
IMM GRANULOCYTES NFR BLD AUTO: 0.6 % (ref 0–0.5)
LYMPHOCYTES # BLD AUTO: 1.78 10*3/MM3 (ref 0.7–3.1)
LYMPHOCYTES NFR BLD AUTO: 18.3 % (ref 19.6–45.3)
MCH RBC QN AUTO: 29.7 PG (ref 26.6–33)
MCHC RBC AUTO-ENTMCNC: 33.5 G/DL (ref 31.5–35.7)
MCV RBC AUTO: 88.6 FL (ref 79–97)
METHADONE UR QL SCN: NEGATIVE
MONOCYTES # BLD AUTO: 0.63 10*3/MM3 (ref 0.1–0.9)
MONOCYTES NFR BLD AUTO: 6.5 % (ref 5–12)
NEUTROPHILS NFR BLD AUTO: 7.21 10*3/MM3 (ref 1.7–7)
NEUTROPHILS NFR BLD AUTO: 73.9 % (ref 42.7–76)
NRBC BLD AUTO-RTO: 0 /100 WBC (ref 0–0.2)
OPIATES UR QL: NEGATIVE
OXYCODONE UR QL SCN: NEGATIVE
PCP UR QL SCN: NEGATIVE
PLATELET # BLD AUTO: 165 10*3/MM3 (ref 140–450)
PMV BLD AUTO: 10.1 FL (ref 6–12)
RBC # BLD AUTO: 3.7 10*6/MM3 (ref 3.77–5.28)
RH BLD: POSITIVE
T PALLIDUM IGG SER QL: NORMAL
T&S EXPIRATION DATE: NORMAL
TRICYCLICS UR QL SCN: NEGATIVE
WBC NRBC COR # BLD AUTO: 9.75 10*3/MM3 (ref 3.4–10.8)

## 2024-04-11 PROCEDURE — 86900 BLOOD TYPING SEROLOGIC ABO: CPT | Performed by: OBSTETRICS & GYNECOLOGY

## 2024-04-11 PROCEDURE — 25010000002 ROPIVACAINE PER 1 MG: Performed by: ANESTHESIOLOGY

## 2024-04-11 PROCEDURE — C1755 CATHETER, INTRASPINAL: HCPCS

## 2024-04-11 PROCEDURE — 85025 COMPLETE CBC W/AUTO DIFF WBC: CPT | Performed by: OBSTETRICS & GYNECOLOGY

## 2024-04-11 PROCEDURE — C1755 CATHETER, INTRASPINAL: HCPCS | Performed by: ANESTHESIOLOGY

## 2024-04-11 PROCEDURE — 36415 COLL VENOUS BLD VENIPUNCTURE: CPT | Performed by: OBSTETRICS & GYNECOLOGY

## 2024-04-11 PROCEDURE — 86901 BLOOD TYPING SEROLOGIC RH(D): CPT | Performed by: OBSTETRICS & GYNECOLOGY

## 2024-04-11 PROCEDURE — 25010000002 FENTANYL CITRATE (PF) 50 MCG/ML SOLUTION: Performed by: ANESTHESIOLOGY

## 2024-04-11 PROCEDURE — 25810000003 LACTATED RINGERS SOLUTION: Performed by: OBSTETRICS & GYNECOLOGY

## 2024-04-11 PROCEDURE — 82948 REAGENT STRIP/BLOOD GLUCOSE: CPT

## 2024-04-11 PROCEDURE — 86850 RBC ANTIBODY SCREEN: CPT | Performed by: OBSTETRICS & GYNECOLOGY

## 2024-04-11 PROCEDURE — 25810000003 LACTATED RINGERS PER 1000 ML: Performed by: OBSTETRICS & GYNECOLOGY

## 2024-04-11 PROCEDURE — 86780 TREPONEMA PALLIDUM: CPT | Performed by: OBSTETRICS & GYNECOLOGY

## 2024-04-11 PROCEDURE — 59025 FETAL NON-STRESS TEST: CPT

## 2024-04-11 PROCEDURE — 80307 DRUG TEST PRSMV CHEM ANLYZR: CPT | Performed by: OBSTETRICS & GYNECOLOGY

## 2024-04-11 RX ORDER — OXYTOCIN/0.9 % SODIUM CHLORIDE 30/500 ML
999 PLASTIC BAG, INJECTION (ML) INTRAVENOUS ONCE
Status: DISCONTINUED | OUTPATIENT
Start: 2024-04-11 | End: 2024-04-11 | Stop reason: HOSPADM

## 2024-04-11 RX ORDER — ROPIVACAINE HYDROCHLORIDE 5 MG/ML
INJECTION, SOLUTION EPIDURAL; INFILTRATION; PERINEURAL AS NEEDED
Status: DISCONTINUED | OUTPATIENT
Start: 2024-04-11 | End: 2024-04-11 | Stop reason: SURG

## 2024-04-11 RX ORDER — FENTANYL/ROPIVACAINE/NS/PF 2MCG/ML-.2
12 PLASTIC BAG, INJECTION (ML) INJECTION CONTINUOUS
Status: DISCONTINUED | OUTPATIENT
Start: 2024-04-11 | End: 2024-04-11

## 2024-04-11 RX ORDER — CARBOPROST TROMETHAMINE 250 UG/ML
250 INJECTION, SOLUTION INTRAMUSCULAR AS NEEDED
Status: DISCONTINUED | OUTPATIENT
Start: 2024-04-11 | End: 2024-04-11 | Stop reason: HOSPADM

## 2024-04-11 RX ORDER — IBUPROFEN 800 MG/1
800 TABLET ORAL 3 TIMES DAILY
Status: DISCONTINUED | OUTPATIENT
Start: 2024-04-11 | End: 2024-04-13 | Stop reason: HOSPADM

## 2024-04-11 RX ORDER — ACETAMINOPHEN 325 MG/1
650 TABLET ORAL EVERY 4 HOURS PRN
Status: DISCONTINUED | OUTPATIENT
Start: 2024-04-11 | End: 2024-04-11 | Stop reason: HOSPADM

## 2024-04-11 RX ORDER — SODIUM CHLORIDE 9 MG/ML
40 INJECTION, SOLUTION INTRAVENOUS AS NEEDED
Status: DISCONTINUED | OUTPATIENT
Start: 2024-04-11 | End: 2024-04-11 | Stop reason: HOSPADM

## 2024-04-11 RX ORDER — SODIUM CHLORIDE, SODIUM LACTATE, POTASSIUM CHLORIDE, CALCIUM CHLORIDE 600; 310; 30; 20 MG/100ML; MG/100ML; MG/100ML; MG/100ML
125 INJECTION, SOLUTION INTRAVENOUS CONTINUOUS
Status: DISCONTINUED | OUTPATIENT
Start: 2024-04-11 | End: 2024-04-11

## 2024-04-11 RX ORDER — BUTORPHANOL TARTRATE 1 MG/ML
1 INJECTION, SOLUTION INTRAMUSCULAR; INTRAVENOUS
Status: DISCONTINUED | OUTPATIENT
Start: 2024-04-11 | End: 2024-04-11 | Stop reason: HOSPADM

## 2024-04-11 RX ORDER — OXYTOCIN/0.9 % SODIUM CHLORIDE 30/500 ML
250 PLASTIC BAG, INJECTION (ML) INTRAVENOUS CONTINUOUS
Status: ACTIVE | OUTPATIENT
Start: 2024-04-11 | End: 2024-04-11

## 2024-04-11 RX ORDER — HYDROXYZINE HYDROCHLORIDE 25 MG/1
50 TABLET, FILM COATED ORAL NIGHTLY PRN
Status: DISCONTINUED | OUTPATIENT
Start: 2024-04-11 | End: 2024-04-13 | Stop reason: HOSPADM

## 2024-04-11 RX ORDER — ONDANSETRON 2 MG/ML
4 INJECTION INTRAMUSCULAR; INTRAVENOUS ONCE AS NEEDED
Status: DISCONTINUED | OUTPATIENT
Start: 2024-04-11 | End: 2024-04-11 | Stop reason: HOSPADM

## 2024-04-11 RX ORDER — ONDANSETRON 2 MG/ML
4 INJECTION INTRAMUSCULAR; INTRAVENOUS EVERY 6 HOURS PRN
Status: DISCONTINUED | OUTPATIENT
Start: 2024-04-11 | End: 2024-04-11 | Stop reason: HOSPADM

## 2024-04-11 RX ORDER — CARBOPROST TROMETHAMINE 250 UG/ML
250 INJECTION, SOLUTION INTRAMUSCULAR
Status: DISCONTINUED | OUTPATIENT
Start: 2024-04-11 | End: 2024-04-13 | Stop reason: HOSPADM

## 2024-04-11 RX ORDER — ONDANSETRON 2 MG/ML
4 INJECTION INTRAMUSCULAR; INTRAVENOUS EVERY 6 HOURS PRN
Status: DISCONTINUED | OUTPATIENT
Start: 2024-04-11 | End: 2024-04-13 | Stop reason: HOSPADM

## 2024-04-11 RX ORDER — BISACODYL 10 MG
10 SUPPOSITORY, RECTAL RECTAL DAILY PRN
Status: DISCONTINUED | OUTPATIENT
Start: 2024-04-12 | End: 2024-04-13 | Stop reason: HOSPADM

## 2024-04-11 RX ORDER — HYDROCODONE BITARTRATE AND ACETAMINOPHEN 5; 325 MG/1; MG/1
1 TABLET ORAL EVERY 4 HOURS PRN
Status: DISCONTINUED | OUTPATIENT
Start: 2024-04-11 | End: 2024-04-13 | Stop reason: HOSPADM

## 2024-04-11 RX ORDER — DIPHENHYDRAMINE HYDROCHLORIDE 50 MG/ML
12.5 INJECTION INTRAMUSCULAR; INTRAVENOUS EVERY 8 HOURS PRN
Status: DISCONTINUED | OUTPATIENT
Start: 2024-04-11 | End: 2024-04-11 | Stop reason: HOSPADM

## 2024-04-11 RX ORDER — ROPIVACAINE HYDROCHLORIDE 2 MG/ML
14 INJECTION, SOLUTION EPIDURAL; INFILTRATION; PERINEURAL CONTINUOUS
Status: DISCONTINUED | OUTPATIENT
Start: 2024-04-11 | End: 2024-04-11

## 2024-04-11 RX ORDER — FAMOTIDINE 10 MG/ML
20 INJECTION, SOLUTION INTRAVENOUS ONCE AS NEEDED
Status: DISCONTINUED | OUTPATIENT
Start: 2024-04-11 | End: 2024-04-11 | Stop reason: HOSPADM

## 2024-04-11 RX ORDER — HYDROCODONE BITARTRATE AND ACETAMINOPHEN 5; 325 MG/1; MG/1
1 TABLET ORAL EVERY 4 HOURS PRN
Status: DISCONTINUED | OUTPATIENT
Start: 2024-04-11 | End: 2024-04-11 | Stop reason: HOSPADM

## 2024-04-11 RX ORDER — LIDOCAINE HCL/EPINEPHRINE/PF 2%-1:200K
VIAL (ML) INJECTION AS NEEDED
Status: DISCONTINUED | OUTPATIENT
Start: 2024-04-11 | End: 2024-04-11 | Stop reason: SURG

## 2024-04-11 RX ORDER — SODIUM CHLORIDE 0.9 % (FLUSH) 0.9 %
10 SYRINGE (ML) INJECTION AS NEEDED
Status: DISCONTINUED | OUTPATIENT
Start: 2024-04-11 | End: 2024-04-11 | Stop reason: HOSPADM

## 2024-04-11 RX ORDER — HYDROCORTISONE 25 MG/G
1 CREAM TOPICAL AS NEEDED
Status: DISCONTINUED | OUTPATIENT
Start: 2024-04-11 | End: 2024-04-13 | Stop reason: HOSPADM

## 2024-04-11 RX ORDER — METHYLERGONOVINE MALEATE 0.2 MG/ML
200 INJECTION INTRAVENOUS ONCE AS NEEDED
Status: DISCONTINUED | OUTPATIENT
Start: 2024-04-11 | End: 2024-04-13 | Stop reason: HOSPADM

## 2024-04-11 RX ORDER — FENTANYL CITRATE 50 UG/ML
100 INJECTION, SOLUTION INTRAMUSCULAR; INTRAVENOUS ONCE
Status: DISCONTINUED | OUTPATIENT
Start: 2024-04-11 | End: 2024-04-11 | Stop reason: HOSPADM

## 2024-04-11 RX ORDER — EPHEDRINE SULFATE 5 MG/ML
10 INJECTION INTRAVENOUS
Status: DISCONTINUED | OUTPATIENT
Start: 2024-04-11 | End: 2024-04-11 | Stop reason: HOSPADM

## 2024-04-11 RX ORDER — ACETAMINOPHEN 325 MG/1
650 TABLET ORAL EVERY 6 HOURS PRN
Status: DISCONTINUED | OUTPATIENT
Start: 2024-04-11 | End: 2024-04-13 | Stop reason: HOSPADM

## 2024-04-11 RX ORDER — ONDANSETRON 4 MG/1
4 TABLET, ORALLY DISINTEGRATING ORAL EVERY 6 HOURS PRN
Status: DISCONTINUED | OUTPATIENT
Start: 2024-04-11 | End: 2024-04-11 | Stop reason: HOSPADM

## 2024-04-11 RX ORDER — OXYTOCIN/0.9 % SODIUM CHLORIDE 30/500 ML
125 PLASTIC BAG, INJECTION (ML) INTRAVENOUS CONTINUOUS PRN
Status: DISCONTINUED | OUTPATIENT
Start: 2024-04-11 | End: 2024-04-13 | Stop reason: HOSPADM

## 2024-04-11 RX ORDER — MISOPROSTOL 100 UG/1
600 TABLET ORAL ONCE AS NEEDED
Status: DISCONTINUED | OUTPATIENT
Start: 2024-04-11 | End: 2024-04-13 | Stop reason: HOSPADM

## 2024-04-11 RX ORDER — IBUPROFEN 800 MG/1
800 TABLET ORAL EVERY 8 HOURS SCHEDULED
Status: DISCONTINUED | OUTPATIENT
Start: 2024-04-11 | End: 2024-04-11 | Stop reason: HOSPADM

## 2024-04-11 RX ORDER — METHYLERGONOVINE MALEATE 0.2 MG/ML
200 INJECTION INTRAVENOUS ONCE AS NEEDED
Status: DISCONTINUED | OUTPATIENT
Start: 2024-04-11 | End: 2024-04-11 | Stop reason: HOSPADM

## 2024-04-11 RX ORDER — HYDROCODONE BITARTRATE AND ACETAMINOPHEN 10; 325 MG/1; MG/1
1 TABLET ORAL EVERY 4 HOURS PRN
Status: DISCONTINUED | OUTPATIENT
Start: 2024-04-11 | End: 2024-04-13 | Stop reason: HOSPADM

## 2024-04-11 RX ORDER — TERBUTALINE SULFATE 1 MG/ML
0.2 INJECTION, SOLUTION SUBCUTANEOUS AS NEEDED
Status: DISCONTINUED | OUTPATIENT
Start: 2024-04-11 | End: 2024-04-11 | Stop reason: HOSPADM

## 2024-04-11 RX ORDER — MISOPROSTOL 100 UG/1
600 TABLET ORAL AS NEEDED
Status: DISCONTINUED | OUTPATIENT
Start: 2024-04-11 | End: 2024-04-11 | Stop reason: HOSPADM

## 2024-04-11 RX ORDER — OXYTOCIN/0.9 % SODIUM CHLORIDE 30/500 ML
2-20 PLASTIC BAG, INJECTION (ML) INTRAVENOUS
Status: DISCONTINUED | OUTPATIENT
Start: 2024-04-11 | End: 2024-04-11 | Stop reason: HOSPADM

## 2024-04-11 RX ORDER — ROPIVACAINE HYDROCHLORIDE 2 MG/ML
INJECTION, SOLUTION EPIDURAL; INFILTRATION; PERINEURAL CONTINUOUS PRN
Status: DISCONTINUED | OUTPATIENT
Start: 2024-04-11 | End: 2024-04-11 | Stop reason: SURG

## 2024-04-11 RX ORDER — PRENATAL VIT/IRON FUM/FOLIC AC 27MG-0.8MG
1 TABLET ORAL DAILY
Status: DISCONTINUED | OUTPATIENT
Start: 2024-04-12 | End: 2024-04-13 | Stop reason: HOSPADM

## 2024-04-11 RX ORDER — DOCUSATE SODIUM 100 MG/1
100 CAPSULE, LIQUID FILLED ORAL 2 TIMES DAILY
Status: DISCONTINUED | OUTPATIENT
Start: 2024-04-12 | End: 2024-04-13 | Stop reason: HOSPADM

## 2024-04-11 RX ORDER — HYDROCORTISONE ACETATE PRAMOXINE HCL 1; 1 G/100G; G/100G
1 CREAM TOPICAL AS NEEDED
Status: DISCONTINUED | OUTPATIENT
Start: 2024-04-11 | End: 2024-04-13 | Stop reason: HOSPADM

## 2024-04-11 RX ORDER — SODIUM CHLORIDE 0.9 % (FLUSH) 0.9 %
1-10 SYRINGE (ML) INJECTION AS NEEDED
Status: DISCONTINUED | OUTPATIENT
Start: 2024-04-11 | End: 2024-04-13 | Stop reason: HOSPADM

## 2024-04-11 RX ORDER — FENTANYL CITRATE 50 UG/ML
INJECTION, SOLUTION INTRAMUSCULAR; INTRAVENOUS AS NEEDED
Status: DISCONTINUED | OUTPATIENT
Start: 2024-04-11 | End: 2024-04-11 | Stop reason: SURG

## 2024-04-11 RX ORDER — MAGNESIUM HYDROXIDE 1200 MG/15ML
1000 LIQUID ORAL ONCE AS NEEDED
Status: DISCONTINUED | OUTPATIENT
Start: 2024-04-11 | End: 2024-04-11 | Stop reason: HOSPADM

## 2024-04-11 RX ORDER — ONDANSETRON 4 MG/1
4 TABLET, ORALLY DISINTEGRATING ORAL EVERY 6 HOURS PRN
Status: DISCONTINUED | OUTPATIENT
Start: 2024-04-11 | End: 2024-04-13 | Stop reason: HOSPADM

## 2024-04-11 RX ADMIN — Medication 1 APPLICATION: at 16:29

## 2024-04-11 RX ADMIN — ROPIVACAINE HYDROCHLORIDE 8 ML: 5 INJECTION, SOLUTION EPIDURAL; INFILTRATION; PERINEURAL at 09:40

## 2024-04-11 RX ADMIN — LIDOCAINE HYDROCHLORIDE,EPINEPHRINE BITARTRATE 3 ML: 20; .005 INJECTION, SOLUTION EPIDURAL; INFILTRATION; INTRACAUDAL; PERINEURAL at 09:40

## 2024-04-11 RX ADMIN — SODIUM CHLORIDE, POTASSIUM CHLORIDE, SODIUM LACTATE AND CALCIUM CHLORIDE 1000 ML: 600; 310; 30; 20 INJECTION, SOLUTION INTRAVENOUS at 08:29

## 2024-04-11 RX ADMIN — FENTANYL CITRATE 100 MCG: 50 INJECTION INTRAMUSCULAR; INTRAVENOUS at 09:40

## 2024-04-11 RX ADMIN — SODIUM CHLORIDE, POTASSIUM CHLORIDE, SODIUM LACTATE AND CALCIUM CHLORIDE 125 ML/HR: 600; 310; 30; 20 INJECTION, SOLUTION INTRAVENOUS at 06:30

## 2024-04-11 RX ADMIN — ROPIVACAINE HYDROCHLORIDE 12 ML/HR: 2 INJECTION, SOLUTION EPIDURAL; INFILTRATION at 09:40

## 2024-04-11 RX ADMIN — SODIUM CHLORIDE, POTASSIUM CHLORIDE, SODIUM LACTATE AND CALCIUM CHLORIDE 125 ML/HR: 600; 310; 30; 20 INJECTION, SOLUTION INTRAVENOUS at 10:04

## 2024-04-11 RX ADMIN — IBUPROFEN 800 MG: 800 TABLET, FILM COATED ORAL at 16:01

## 2024-04-11 RX ADMIN — EPHEDRINE SULFATE 10 MG: 5 INJECTION INTRAVENOUS at 12:03

## 2024-04-11 RX ADMIN — Medication 2 MILLI-UNITS/MIN: at 07:41

## 2024-04-11 NOTE — ANESTHESIA PREPROCEDURE EVALUATION
Anesthesia Evaluation     Patient summary reviewed and Nursing notes reviewed   no history of anesthetic complications:   NPO Solid Status: > 8 hours  NPO Liquid Status: > 8 hours           Airway   Mallampati: II  TM distance: >3 FB  Neck ROM: full  No difficulty expected  Dental - normal exam     Pulmonary - negative pulmonary ROS and normal exam    breath sounds clear to auscultation  Cardiovascular - negative cardio ROS and normal exam    Rhythm: regular  Rate: normal        Neuro/Psych- negative ROS  GI/Hepatic/Renal/Endo - negative ROS     Musculoskeletal (-) negative ROS    Abdominal  - normal exam   Substance History - negative use     OB/GYN    (+) Pregnant (IUP 37 5/7 weeks gest)        Other   blood dyscrasia anemia,                   Anesthesia Plan    ASA 2     epidural       Anesthetic plan, risks, benefits, and alternatives have been provided, discussed and informed consent has been obtained with: patient.  Pre-procedure education provided  Use of blood products discussed with patient  Consented to blood products.    Plan discussed with CRNA.    CODE STATUS:    Level Of Support Discussed With: Patient  Code Status (Patient has no pulse and is not breathing): CPR (Attempt to Resuscitate)  Medical Interventions (Patient has pulse or is breathing): Full

## 2024-04-11 NOTE — NON STRESS TEST
Alicia Nails, a  at 37w5d with an DORA of 2024, Date entered prior to episode creation, was seen at [facility] for a nonstress test.    Chief Complaint   Patient presents with    Scheduled Induction     PT ARRIVED TO L/D FOR SCHEDULED INDUCTION.       Patient Active Problem List   Diagnosis    Premature cervical dilation    Abdominal pain during pregnancy in third trimester     labor    Pregnancy    Abnormal genetic test during pregnancy    Single umbilical artery    IUGR (intrauterine growth retardation) affecting mother, third trimester, not applicable or unspecified fetus       Start Time: 704  Stop Time: 724    Interpretation A  Nonstress Test Interpretation A: Reactive  Comments A: VERIFIED BY ASMITA THURMAN RN

## 2024-04-11 NOTE — H&P
TORRES Brock  Obstetric History and Physical    Chief Complaint   Patient presents with    Scheduled Induction     PT ARRIVED TO L/D FOR SCHEDULED INDUCTION.       Subjective     Patient is a 23 y.o. female  currently at 37w5d, who presents for IOL due to severe IUGR.    Her prenatal care is complicated by  abnormal prenatal genetic screening  NIPT.  Her previous obstetric/gynecological history is noted for is non-contributory.    The following portions of the patient's history were reviewed and updated as appropriate: current medications, allergies, past medical history, past surgical history, past family history, past social history, and problem list .   Social History     Socioeconomic History    Marital status:    Tobacco Use    Smoking status: Never     Passive exposure: Never    Smokeless tobacco: Never   Vaping Use    Vaping status: Never Used   Substance and Sexual Activity    Alcohol use: No    Drug use: No    Sexual activity: Yes     Partners: Male     Birth control/protection: None     Past Medical History:   Diagnosis Date    Ectopic pregnancy     Ectopic pregnancy 2021       Current Facility-Administered Medications:     acetaminophen (TYLENOL) tablet 650 mg, 650 mg, Oral, Q4H PRN, Mana Astorga DO    butorphanol (STADOL) injection 1 mg, 1 mg, Intravenous, Q2H PRN, Mana Astorga DO    butorphanol (STADOL) injection 2 mg, 2 mg, Intravenous, Q3H PRN, Mana Astorga DO    lactated ringers bolus 1,000 mL, 1,000 mL, Intravenous, Once PRN, Mana Astorga DO    lactated ringers infusion, 125 mL/hr, Intravenous, Continuous, Mana Astorga DO, Last Rate: 125 mL/hr at 24 0630, 125 mL/hr at 24 0630    ondansetron ODT (ZOFRAN-ODT) disintegrating tablet 4 mg, 4 mg, Oral, Q6H PRN **OR** ondansetron (ZOFRAN) injection 4 mg, 4 mg, Intravenous, Q6H PRN, Mana Astorga DO    oxytocin (PITOCIN) 30 units in 0.9% sodium  chloride 500 mL (premix), 2-20 brien-units/min, Intravenous, Titrated, Mana Astorga, DO, Last Rate: 2 mL/hr at 04/11/24 0741, 2 brien-units/min at 04/11/24 0741    sodium chloride (NS) irrigation solution 1,000 mL, 1,000 mL, Irrigation, Once PRN, Mana Astorga,     sodium chloride 0.9 % flush 10 mL, 10 mL, Intravenous, PRN, Mana Astorga, DO    sodium chloride 0.9 % infusion 40 mL, 40 mL, Intravenous, PRN, Mana Astorga, DO    terbutaline (BRETHINE) injection 0.2 mg, 0.2 mg, Subcutaneous, PRN, Mana Astorga, DO  No Known Allergies  Past Surgical History:   Procedure Laterality Date    ECTOPIC PREGNANCY  12/01/2021    WISDOM TOOTH EXTRACTION  10/16/2017         Prenatal Information:   Maternal Prenatal Labs  Blood Type ABO Type   Date Value Ref Range Status   04/11/2024 O  Final      Rh Status RH type   Date Value Ref Range Status   04/11/2024 Positive  Final      Antibody Screen Antibody Screen   Date Value Ref Range Status   04/11/2024 Negative  Final      Rapid Urine Drug Screen Barbiturates Screen, Urine   Date Value Ref Range Status   04/11/2024 Negative Negative Final     Benzodiazepine Screen, Urine   Date Value Ref Range Status   04/11/2024 Negative Negative Final     Methadone Screen, Urine   Date Value Ref Range Status   04/11/2024 Negative Negative Final     Opiate Screen   Date Value Ref Range Status   04/11/2024 Negative Negative Final     THC, Screen, Urine   Date Value Ref Range Status   04/11/2024 Negative Negative Final     Cocaine Screen, Urine   Date Value Ref Range Status   04/11/2024 Negative Negative Final     Amphetamine Screen, Urine   Date Value Ref Range Status   04/11/2024 Negative Negative Final     Buprenorphine, Screen, Urine   Date Value Ref Range Status   04/11/2024 Negative Negative Final     Methamphetamine, Ur   Date Value Ref Range Status   04/11/2024 Negative Negative Final     Oxycodone Screen, Urine   Date Value Ref Range  "Status   04/11/2024 Negative Negative Final     Tricyclic Antidepressants Screen   Date Value Ref Range Status   04/11/2024 Negative Negative Final      Group B Strep Culture No results found for: \"GBSANTIGEN\"           External Prenatal Results       Pregnancy Outside Results - Transcribed From Office Records - See Scanned Records For Details       Test Value Date Time    ABO  O  04/11/24 0632    Rh  Positive  04/11/24 0632    Antibody Screen  Negative  04/11/24 0632       Negative  03/14/24 0211       Negative  03/03/24 2312       Negative  09/04/23 1855    Varicella IgG       Rubella  1.20 index 03/03/24 2312    Hgb  11.0 g/dL 04/11/24 0632       11.2 g/dL 03/14/24 0136       11.0 g/dL 03/03/24 2312       11.5 g/dL 03/02/24 2344       14.1 g/dL 09/04/23 1855    Hct  32.8 % 04/11/24 0632       32.9 % 03/14/24 0136       32.6 % 03/03/24 2312       34.6 % 03/02/24 2344       42.3 % 09/04/23 1855    Glucose Fasting GTT       Glucose Tolerance Test 1 hour ^ 81  02/04/19     Glucose Tolerance Test 3 hour       Gonorrhea (discrete) ^ Negative  09/13/18     Chlamydia (discrete) ^ Negative  09/13/18     RPR  Non-Reactive  03/03/24 2312    VDRL       Syphilis Antibody       HBsAg  Non-Reactive  03/03/24 2312    Herpes Simplex Virus PCR       Herpes Simplex VIrus Culture       HIV  Non-Reactive  03/03/24 2312    Hep C RNA Quant PCR       Hep C Antibody  Non-Reactive  03/03/24 2312    AFP       Group B Strep ^ Negative  03/29/24     GBS Susceptibility to Clindamycin       GBS Susceptibility to Erythromycin       Fetal Fibronectin       Genetic Testing, Maternal Blood                 Drug Screening       Test Value Date Time    Urine Drug Screen       Amphetamine Screen  Negative  04/11/24 0614       Negative  03/14/24 0035    Barbiturate Screen  Negative  04/11/24 0614       Negative  03/14/24 0035    Benzodiazepine Screen  Negative  04/11/24 0614       Negative  03/14/24 0035    Methadone Screen  Negative  04/11/24 0614     "   Negative  24 0035    Phencyclidine Screen  Negative  24 0614       Negative  24 0035    Opiates Screen  Negative  24 0614       Negative  24 0035    THC Screen  Negative  24 0614       Negative  24 0035    Cocaine Screen       Propoxyphene Screen  Negative  14 1129    Buprenorphine Screen  Negative  24 0614       Negative  24 0035    Methamphetamine Screen       Oxycodone Screen  Negative  24 0614       Negative  24 0035    Tricyclic Antidepressants Screen  Negative  24 0614       Negative  24 0035              Legend    ^: Historical                              Past OB History:     OB History    Para Term  AB Living   4 1 1 0 2 1   SAB IAB Ectopic Molar Multiple Live Births   1 0 1 0 0 1      # Outcome Date GA Lbr Wisam/2nd Weight Sex Type Anes PTL Lv   4 Current            3 Ectopic 2021 6w0d    ECTOPIC      2 Term 19 39w0d  3640 g (8 lb 0.4 oz) M Vag-Spont EPI N LIVIA      Birth Comments: 97.8, 170, 60      Name: ADALGISA KENNY      Apgar1: 8  Apgar5: 9   1 SAB 18 7w0d    SAB         Obstetric Comments   FOB #1 Pregnancy #1 SAB at 7 weeks   FOB #1 Pregnancy #2  at 39 weeks, male   FOB #2 Pregnancy #3 Ectopic at 6 weeks   FOB #2 Pregnancy #4 current       Past Medical History: Past Medical History:   Diagnosis Date    Ectopic pregnancy     Ectopic pregnancy 2021      Past Surgical History Past Surgical History:   Procedure Laterality Date    ECTOPIC PREGNANCY  2021    WISDOM TOOTH EXTRACTION  10/16/2017      Family History: Family History   Problem Relation Age of Onset    Hypertension Mother     Diabetes Mother     Thyroid disease Mother     Throat cancer Mother     Stomach cancer Mother     Ovarian cancer Mother     Rheum arthritis Mother     Seizures Mother     Asthma Mother     Hypertension Father     Thyroid disease Father     Asthma Brother     Cancer Maternal Aunt      Hypertension Maternal Grandmother     Stroke Maternal Grandmother     Skin cancer Maternal Grandfather     Atrial fibrillation Maternal Grandfather     Rheum arthritis Maternal Grandfather     Stroke Maternal Grandfather     Thyroid disease Paternal Grandfather     Rheum arthritis Paternal Grandfather       Social History:  reports that she has never smoked. She has never been exposed to tobacco smoke. She has never used smokeless tobacco.   reports no history of alcohol use.   reports no history of drug use.        Review of Systems-all negative except as noted in HPI      Objective     Vital Signs Range for the last 24 hours  Temperature: Temp:  [97.9 °F (36.6 °C)-98.4 °F (36.9 °C)] 98.4 °F (36.9 °C)   Temp Source: Temp src: Temporal   BP: BP: (105-108)/(60-66) 108/66   Pulse: Heart Rate:  [] 78   Respirations: Resp:  [18] 18   Weight: Weight:  [57 kg (125 lb 11.2 oz)] 57 kg (125 lb 11.2 oz)     Physical Examination: General appearance - alert, well appearing, and in no distress, oriented to person, place, and time, normal appearing weight and well hydrated  Mental status - alert, oriented to person, place, and time, normal mood, behavior, speech, dress, motor activity, and thought processes, affect appropriate to mood  Neck - supple, no significant adenopathy  Chest - clear to auscultation, no wheezes, rales or rhonchi, symmetric air entry, no tachypnea, retractions or cyanosis  Heart - normal rate, regular rhythm, normal S1, S2, no murmurs, rubs, clicks or gallops  Abdomen - soft, nontender, gravid uterus, no masses or organomegaly  no rebound tenderness noted,   Pelvic - normal external genitalia, vulva, vagina, cervix, uterus and adnexa  Neurological - alert, oriented, normal speech, no focal findings or movement disorder noted  Musculoskeletal - no joint tenderness, deformity or swelling  Extremities - peripheral pulses normal, no pedal edema, no clubbing or cyanosis  Skin - normal coloration and  turgor, no rashes, no suspicious skin lesions noted        Cervix: Exam by:     Dilation:     Effacement:     Station:       Laboratory Results:   Lab Results (last 24 hours)       Procedure Component Value Units Date/Time    Fentanyl, Urine - Urine, Clean Catch [[id number]]  (Normal) Collected: 04/11/24 0614    Specimen: Urine, Clean Catch Updated: 04/11/24 0714     Fentanyl, Urine Negative    Narrative:      Negative Threshold:      Fentanyl 5 ng/mL     The normal value for the drug tested is negative. This report includes final unconfirmed screening results to be used for medical treatment purposes only. Unconfirmed results must not be used for non-medical purposes such as employment or legal testing. Clinical consideration should be applied to any drug of abuse test, particularly when unconfirmed results are used.           Urine Drug Screen - Urine, Clean Catch [[id number]]  (Normal) Collected: 04/11/24 0614    Specimen: Urine, Clean Catch Updated: 04/11/24 0702     THC, Screen, Urine Negative     Phencyclidine (PCP), Urine Negative     Cocaine Screen, Urine Negative     Methamphetamine, Ur Negative     Opiate Screen Negative     Amphetamine Screen, Urine Negative     Benzodiazepine Screen, Urine Negative     Tricyclic Antidepressants Screen Negative     Methadone Screen, Urine Negative     Barbiturates Screen, Urine Negative     Oxycodone Screen, Urine Negative     Buprenorphine, Screen, Urine Negative    Narrative:      Cutoff For Drugs Screened:    Amphetamines               500 ng/ml  Barbiturates               200 ng/ml  Benzodiazepines            150 ng/ml  Cocaine                    150 ng/ml  Methadone                  200 ng/ml  Opiates                    100 ng/ml  Phencyclidine               25 ng/ml  THC                         50 ng/ml  Methamphetamine            500 ng/ml  Tricyclic Antidepressants  300 ng/ml  Oxycodone                  100 ng/ml  Buprenorphine               10 ng/ml    The normal  value for all drugs tested is negative. This report includes unconfirmed screening results, with the cutoff values listed, to be used for medical treatment purposes only.  Unconfirmed results must not be used for non-medical purposes such as employment or legal testing.  Clinical consideration should be applied to any drug of abuse test, particularly when unconfirmed results are used.      CBC & Differential [[id number]]  (Abnormal) Collected: 04/11/24 0632    Specimen: Blood Updated: 04/11/24 0652    Narrative:      The following orders were created for panel order CBC & Differential.  Procedure                               Abnormality         Status                     ---------                               -----------         ------                     CBC Auto Differential[[id number]]        Abnormal            Final result                 Please view results for these tests on the individual orders.    CBC Auto Differential [[id number]]  (Abnormal) Collected: 04/11/24 0632    Specimen: Blood Updated: 04/11/24 0652     WBC 9.75 10*3/mm3      RBC 3.70 10*6/mm3      Hemoglobin 11.0 g/dL      Hematocrit 32.8 %      MCV 88.6 fL      MCH 29.7 pg      MCHC 33.5 g/dL      RDW 12.8 %      RDW-SD 41.5 fl      MPV 10.1 fL      Platelets 165 10*3/mm3      Neutrophil % 73.9 %      Lymphocyte % 18.3 %      Monocyte % 6.5 %      Eosinophil % 0.2 %      Basophil % 0.5 %      Immature Grans % 0.6 %      Neutrophils, Absolute 7.21 10*3/mm3      Lymphocytes, Absolute 1.78 10*3/mm3      Monocytes, Absolute 0.63 10*3/mm3      Eosinophils, Absolute 0.02 10*3/mm3      Basophils, Absolute 0.05 10*3/mm3      Immature Grans, Absolute 0.06 10*3/mm3      nRBC 0.0 /100 WBC     T Pallidum Antibody w/ reflex RPR (Syphilis) [[id number]] Collected: 04/11/24 0632    Specimen: Blood Updated: 04/11/24 0648    Group B Streptococcus Culture - Swab, Vaginal/Rectum [[id number]] Resulted: 03/29/24    Specimen: Swab from Vaginal/Rectum Updated: 04/11/24  "0639     External Strep Group B Ag Negative          Radiology Review:   Imaging Results (Last 72 Hours)       ** No results found for the last 72 hours. **              Assessment & Plan       Pregnancy      Assessment & Plan    Assessment:  1.  Intrauterine pregnancy at 37w5d weeks gestation with reassuring fetal status.    2.  induction of labor  for severe IUGR, fetus with Klinefelter's syndrome  with favorable cervix  3.  Obstetrical history significant for is noncontributory.  4.  GBS status: No results found for: \"GBSANTIGEN\"    Plan:  1. fetal and uterine monitoring  continuously and cervical ripening with  low dose Pitocin  2. Plan of care has been reviewed with patient   3.  Risks, benefits of treatment plan have been discussed.  4.  All questions have been answered.        Mana Astorga DO  4/11/2024  08:24 EDT    "

## 2024-04-11 NOTE — ANESTHESIA PROCEDURE NOTES
Labor Epidural    Pre-sedation assessment completed: 4/11/2024 9:30 AM    Patient reassessed immediately prior to procedure    Patient location during procedure: OB  Start Time: 4/11/2024 9:31 AM  Stop Time: 4/11/2024 9:46 AM  Indication:at surgeon's request  Performed By  Anesthesiologist: Jamey Mckay DO  Preanesthetic Checklist  Completed: patient identified, IV checked, site marked, risks and benefits discussed, surgical consent, monitors and equipment checked, pre-op evaluation and timeout performed  Prep:  Pt Position:sitting  Sterile Tech:gloves, mask, sterile barrier and cap  Prep:povidone-iodine 7.5% surgical scrub  Monitoring:blood pressure monitoring  Epidural Block Procedure:  Approach:midline  Guidance:landmark technique and palpation technique  Location:L2-L3  Needle Type:Tuohy  Needle Gauge:17 G and 18 G  Loss of Resistance Medium: air  Loss of Resistance: 5cm  Cath Depth at skin:10 cm  Paresthesia: none  Aspiration:negative  Test Dose:negative  Number of Attempts: 1  Post Assessment:  Dressing:occlusive dressing applied and secured with tape  Pt Tolerance:patient tolerated the procedure well with no apparent complications  Complications:no

## 2024-04-11 NOTE — L&D DELIVERY NOTE
Vaginal Delivery Procedure Note    Alicia Nails  Gestational Age-37.5        OBGYN: Mana Astorga DO      Pre-op Diagnosis:   Pt 24 y/o  @ 37.5 weeks for IOL due to severe IUGR and fetus with chromosomal abnormality      Anesthesia: Epidural        Detailed Description of Procedure     The patient was prepped and draped in normal sterile fashion. The head was delivered without difficulty. There was a nuchal cord x 1. Anterior and posterior shoulders delivered without any problems. The rest of the infant was delivered in controlled fashion.The infant was bulb suctioned at delivery. The placenta delivered intact. The patient tolerated the procedure well and went to the recovery room in stable condition.        Time of delivery: 1246  Maternal Blood Type: O Positive  Fetal Gender: Male  Nuchal Cord: x 1  Tears: None  Blood Cord: Yes  Estimated Blood Loss: 50cc  Placenta: Spontaneous, Delivered Intact   APGARS:  8   9          Disposition: Transfer to Women's Health Floor  Condition: Stable    Mana Astorga DO     Date: 2024  Time: 13:00 EDT

## 2024-04-12 LAB
BASOPHILS # BLD AUTO: 0.06 10*3/MM3 (ref 0–0.2)
BASOPHILS NFR BLD AUTO: 0.6 % (ref 0–1.5)
DEPRECATED RDW RBC AUTO: 43.3 FL (ref 37–54)
EOSINOPHIL # BLD AUTO: 0.06 10*3/MM3 (ref 0–0.4)
EOSINOPHIL NFR BLD AUTO: 0.6 % (ref 0.3–6.2)
ERYTHROCYTE [DISTWIDTH] IN BLOOD BY AUTOMATED COUNT: 13.2 % (ref 12.3–15.4)
HCT VFR BLD AUTO: 33.2 % (ref 34–46.6)
HGB BLD-MCNC: 10.6 G/DL (ref 12–15.9)
IMM GRANULOCYTES # BLD AUTO: 0.06 10*3/MM3 (ref 0–0.05)
IMM GRANULOCYTES NFR BLD AUTO: 0.6 % (ref 0–0.5)
LYMPHOCYTES # BLD AUTO: 1.9 10*3/MM3 (ref 0.7–3.1)
LYMPHOCYTES NFR BLD AUTO: 17.7 % (ref 19.6–45.3)
MCH RBC QN AUTO: 29 PG (ref 26.6–33)
MCHC RBC AUTO-ENTMCNC: 31.9 G/DL (ref 31.5–35.7)
MCV RBC AUTO: 91 FL (ref 79–97)
MONOCYTES # BLD AUTO: 0.96 10*3/MM3 (ref 0.1–0.9)
MONOCYTES NFR BLD AUTO: 9 % (ref 5–12)
NEUTROPHILS NFR BLD AUTO: 7.68 10*3/MM3 (ref 1.7–7)
NEUTROPHILS NFR BLD AUTO: 71.5 % (ref 42.7–76)
NRBC BLD AUTO-RTO: 0 /100 WBC (ref 0–0.2)
PLATELET # BLD AUTO: 141 10*3/MM3 (ref 140–450)
PMV BLD AUTO: 10.2 FL (ref 6–12)
RBC # BLD AUTO: 3.65 10*6/MM3 (ref 3.77–5.28)
WBC NRBC COR # BLD AUTO: 10.72 10*3/MM3 (ref 3.4–10.8)

## 2024-04-12 PROCEDURE — 85025 COMPLETE CBC W/AUTO DIFF WBC: CPT | Performed by: OBSTETRICS & GYNECOLOGY

## 2024-04-12 RX ADMIN — PRENATAL VIT W/ FE FUMARATE-FA TAB 27-0.8 MG 1 TABLET: 27-0.8 TAB at 09:30

## 2024-04-12 RX ADMIN — IBUPROFEN 800 MG: 800 TABLET, FILM COATED ORAL at 15:00

## 2024-04-12 RX ADMIN — IBUPROFEN 800 MG: 800 TABLET, FILM COATED ORAL at 20:02

## 2024-04-12 RX ADMIN — IBUPROFEN 800 MG: 800 TABLET, FILM COATED ORAL at 09:30

## 2024-04-12 RX ADMIN — DOCUSATE SODIUM 100 MG: 100 CAPSULE, LIQUID FILLED ORAL at 09:30

## 2024-04-12 NOTE — PLAN OF CARE
Goal Outcome Evaluation:  Plan of Care Reviewed With: patient        Progress: improving  Outcome Evaluation: Fundus firm and midline. Light lochia rubra noted. VSS. Voiding without difficulty. Ambulating independently inside room and to NICU to visit infant.

## 2024-04-12 NOTE — PROGRESS NOTES
" [place]  Vaginal Delivery Progress Note    Subjective   Subjective  Postpartum Day 1: Vaginal Delivery    The patient feels well.  Her pain is well controlled with prescribed pain medications.   She is ambulating well.  Patient describes her bleeding as thin lochia.    Breastfeeding: without difficulty.    Objective     Objective:  Vital signs (most recent): Blood pressure 100/64, pulse 73, temperature 97.9 °F (36.6 °C), temperature source Oral, resp. rate 17, height 157.5 cm (62\"), weight 57 kg (125 lb 11.2 oz), SpO2 95%, currently breastfeeding.     Vital Signs Range for the last 24 hours  Temperature: Temp:  [97.8 °F (36.6 °C)-98.1 °F (36.7 °C)] 97.9 °F (36.6 °C)   Temp Source: Temp src: Oral   BP: BP: ()/(50-83) 100/64   Pulse: Heart Rate:  [] 73   Respirations: Resp:  [16-20] 17   Weight:       Admit Height:  Height: 157.5 cm (62\")    Physical Exam:  General:  no acute distress.  Abdomen: Fundus: appropriate, firm, non tender.  Extremities: normal, atraumatic, no cyanosis, and trace edema.       [unfilled]       Lab Results   Component Value Date    ABO O 04/11/2024    RH Positive 04/11/2024        Lab Results   Component Value Date    HGB 10.6 (L) 04/12/2024    HCT 33.2 (L) 04/12/2024          Assessment:  Assessment & Plan    Pregnancy      Alicia Nails is Day 1 post-partum    Vaginal, Spontaneous   .      Plan:    Continue current care.      Abe Bonilla, APRN  4/12/2024  09:13 EDT  "

## 2024-04-13 VITALS
BODY MASS INDEX: 23.13 KG/M2 | HEART RATE: 80 BPM | WEIGHT: 125.7 LBS | DIASTOLIC BLOOD PRESSURE: 75 MMHG | SYSTOLIC BLOOD PRESSURE: 129 MMHG | TEMPERATURE: 97.9 F | RESPIRATION RATE: 16 BRPM | HEIGHT: 62 IN | OXYGEN SATURATION: 99 %

## 2024-04-13 RX ORDER — IBUPROFEN 800 MG/1
800 TABLET ORAL 3 TIMES DAILY
Qty: 5 TABLET | Refills: 0 | Status: SHIPPED | OUTPATIENT
Start: 2024-04-13 | End: 2024-04-15

## 2024-04-13 NOTE — PLAN OF CARE
Goal Outcome Evaluation:  Plan of Care Reviewed With: patient           Outcome Evaluation: Vital signs stable. Fundus is midline and firm without massage. Lochia is light with no clots noted. Voids without difficulty. Ambulating independently. Denies need for pain medication. Tolerating diet. Patient requests discharge, infant being transported to higher level of care.

## 2024-04-13 NOTE — DISCHARGE INSTRUCTIONS
Follow up with physician in 2-3 weeks. Call to scheduled appt. Contact physician with any of the following: increased bleeding, swelling in the hands, feet or face, blood clots, fever, abnormal discharge, odor, or any other signs of infection, headache, or vision changes. No intercourse, douching, or tampon use until released by physician (around six weeks). No submersion in water including pools, hot tubs and tub baths, showers only. Walking and light activities are encouraged. Do not lift more than ten pounds until released by physician. Eat a healthy diet including protein rich foods such as lean meat and leafy green vegetables.

## 2024-04-13 NOTE — DISCHARGE SUMMARY
TORRES Brock  Delivery Discharge Summary    Primary OB Clinician:     EDC: Estimated Date of Delivery: 24    Gestational Age:37w5d    Antepartum complications: intrauterine growth restriction    Date of Delivery: 2024   Time of Delivery: 12:46 PM     Delivered By:  Mana Astorga     Delivery Type: Vaginal, Spontaneous      Tubal Ligation: n/a    Baby:male  infant;   Apgar:  8  @ 1 minute /   Apgar:  9  @ 5 minutes   Weight: 2486 g (5 lb 7.7 oz)      Anesthesia: Epidural      Intrapartum complications: None    [unfilled]       Lab Results   Component Value Date    ABO O 2024    RH Positive 2024        Lab Results   Component Value Date    HGB 10.6 (L) 2024    HCT 33.2 (L) 2024         Discharge Date: 2024; Discharge Time: 11:00 EDT        Plan:    Follow-up appointment with [facility] in 3 weeks.      Mana Astorga DO  2024  11:00 EDT

## 2024-04-25 ENCOUNTER — PATIENT OUTREACH (OUTPATIENT)
Dept: LABOR AND DELIVERY | Facility: HOSPITAL | Age: 24
End: 2024-04-25
Payer: MEDICAID

## 2024-04-25 NOTE — OUTREACH NOTE
Motherhood Connection  Postpartum Check-In    Questions/Answers      Flowsheet Row Responses   Visit Setting Telephone   Best Method for Contacting Home   OB Discharge Note Reviewed  Reviewed   OB Discharge Navigator Reviewed  Reviewed   OB Discharge Medications Reviewed  Reviewed    discharged home with mother? Other   Comment Infant was transferred to [facility]. Is at home with mom now.   Current Pain Levels 0-10 0   At Rest Pain Levels 0-10 0   Pain level with activity 0-10 0   Acceptable Pain Level 0-10 2   Pain Location --  [Pt denies pain at this time and states she has had very little pain since delivering infant.]   Pain Description --  [Denies]   Verbalized Emotional State Acceptance   Family/Support Network Family, Significant Other   Level of Involvement in Care Attentive, Supportive   Do you feel comfortable in your relationship with your baby? Yes   Have members of your household adjusted to your baby? Yes   Is the baby's father supportive and/or involved with the baby? Yes   How does your partner feel about the baby? Happy, Involved   Do you feel safe at home, school and work? Yes   Are you in a relationship with someone who threatens you or hurts you? No   Do you have the resources to keep yourself and your baby healthy and safe? Yes   Lochia (per patient report) [place]   Amount Spotting   Number of pads per day 2   Lochia Odor None   Is patient breastfeeding? Yes, pumping   Breast Care Supportive Bra   pumping - Left Breast Full, Soft   Pumping - Right Breast Full, Soft   Pumping - Left Nipple Intact   Pumping - Right Nipple Intact   Frequency Every 3 hours   Pumping Quantity 120 - 180 ml   Storage of Milk refrigerator/freezer   Postpartum Depression Screening Education Education Provided   Peripheral Blood Glucose Other   Doctor Appointments: Education Provided   Breastfeeding Education Education Provided   Family Planning Education Declined   Postpartum Care Education  Education Provided   S & S to report Education Provided   Followup Appointments Made Yes   Well Child Visit Appointments Made Yes   Appointment Date 05/02/24   Provider/Agency HAIDER Perez, [facility]   Peripheral Blood Glucose Education Referred NA   Well Child Checkup Provider Name IRENE Garcia DO, [facility]   Well Child Check Up Date: 04/23/24   Did you complete the visit? Yes   Were there any specific concerns? No   Umbilical Cord No reported signs or symptoms   Was the baby circumcised? No   Feeding Readiness Cues: Crying, Eager   Infant Feeding Method Expressed Breast Milk   Feeding Tolerance Adequate pause for breath, Alert for feeding   Is a lactation referral indicated? No  [Pt declined referral at this time.]   Expressed milk PO (mL) 75 - 90 ml   Expressed milk- frequency of feedings every 3 hours   Number of wet diapers x 24 hours 7   Last BM x 24 hours 3   Emesis (Unmeasured Occurence) 0   What safe sleep surface is available? Bassinet, Crib   Are there stuffed animals, toys, pillows, quilts, blankets, wedges, positioners, bumpers or other loose bedding in the infant's sleeping environment? No   Where does the baby usually sleep? Bassinet   Does the baby ever share a sleep surface with a sibling, adult or pet? No   Does the baby ever share a sleep surface in a bed, couch, recliner or other? No   What position do you place your baby to sleep for naps? Back   What position do you place your baby to sleep at night Back   Are you and/or other caregivers smoking inside or outside the baby's home? No   Is the infant dressed appropiately for the temperature of the home? Yes   Do you use a clean, dry pacifier that is not attached to a string or stuffed animal? Yes            Review of Systems   Constitutional: Negative.    HENT: Negative.     Eyes: Negative.    Respiratory: Negative.     Cardiovascular: Negative.    Gastrointestinal: Negative.    Endocrine: Negative.    Genitourinary:  Positive  for vaginal bleeding.   Musculoskeletal: Negative.    Skin: Negative.    Allergic/Immunologic: Negative.    Neurological: Negative.    Hematological: Negative.    Psychiatric/Behavioral: Negative.       Most Recent [facility]  Depression Scale Score (EPDS)    Performed by a clinician: 0 (2024  2:09 PM)    Received via [employer] questionnaire:  ()     Spoke with patient over the phone. Pt had vaginal delivery of her infant son on 2024. Pts infant was transferred to [facility] for respiratory distress but has since been discharged home to patient. Pt reports she does not currently have any pain and reports she has had very little pain since delivery. Pt reports spotty vaginal bleeding that is very light in color. She reports she is pumping her breasts and is feeding her infant expressed breast milk due to being unable to establish latch to breast after delivery because of infant's small size and the infant being intubated. Pt states she does attempt to latch her infant onto breast daily but does not want lactation consult at this time. Pt states her infant is doing well with bottle feeding expressed breast milk and had gained weight at pediatrician appointment on 2024. Pt reports her infant son is having good voids and stools. EPDS and 5Ps completed with patient.     5 Ps Screen  Completed.    Referral submitted to the following resources (verbal consent received to submit demographic information):         Caroline Yusuf RN  Maternity Nurse Navigator    2024, 15:03 EDT

## 2024-05-02 ENCOUNTER — PATIENT OUTREACH (OUTPATIENT)
Dept: CALL CENTER | Facility: HOSPITAL | Age: 24
End: 2024-05-02
Payer: MEDICAID

## 2024-05-02 NOTE — OUTREACH NOTE
Motherhood Connection Survey      Flowsheet Row Responses   [facility] patient discharged from? [place]   Week 1 attempt successful? Yes   Call start time 1540   Call end time 1550   Baby sex Boy   [place] discharged home with mother? Yes   Baby sex Boy   Delivery type Vaginal   Emotional state Acceptance   Family support Yes   Do you have all necessary resources to care for you and your baby?  Yes   Have members of your household adjusted to your baby? Yes   Did you have any problems with pre-eclampsia during this pregnancy? No   Do you have any of the following: No Issues   Did you have blood glucose issues during this pregnancy No   Lochia amount None   Additional comments Mom is doing well. No issues at all. She is feeling back to normal.   Feeding Method Combination   Frequency Mom puts him to breast daily, and gives him pumped breastmilk every 3 hours. He is taking 4 to 5 ounces now.   Pumping Yes   Storage of Milk refrigerator/freezer   Breast Condition No   Nipple Condition No   Signs baby is ready to eat Rooting   Feeding tolerance Weight gain, Wet/dirty diapers, Adequate pause for breath   Number of wet diapers x 24 hours Numerous times a day, with every feeding   Last BM x 24 hours 3   Umbilical Cord No reported signs or symptoms   Umbilical cord comments Cord has dried and fallen off   Was the baby circumcised? No   Where does the baby usually sleep? Bassinet   Are there stuffed animals, toys, pillows, quilts, blankets, wedges, positioners, bumpers or other loose bedding in the infant's sleeping environment? No   Does the baby ever share a sleep surface in a bed, couch, recliner or other? No   What position do you lay your baby down to sleep? Back   Are you and/or other caregivers smoking inside or outside the baby's home? No   Mom appointment comments: Mom has been to followup already.   Baby appointment comments: Baby has been to peds. Doing great. Gaining weight.   Call completed? Yes   How  satisfied were you with the Motherhood Connection Program? 5              Frank HUTSON - Registered Nurse